# Patient Record
Sex: MALE | Race: WHITE | Employment: OTHER | URBAN - METROPOLITAN AREA
[De-identification: names, ages, dates, MRNs, and addresses within clinical notes are randomized per-mention and may not be internally consistent; named-entity substitution may affect disease eponyms.]

---

## 2017-03-07 ENCOUNTER — HOSPITAL ENCOUNTER (OUTPATIENT)
Dept: CT IMAGING | Age: 62
Discharge: HOME | End: 2017-03-07
Attending: ORTHOPAEDIC SURGERY
Payer: COMMERCIAL

## 2017-03-07 DIAGNOSIS — M46.46 DISCITIS OF LUMBAR REGION: ICD-10-CM

## 2017-03-07 DIAGNOSIS — Z98.1 S/P LUMBAR FUSION: ICD-10-CM

## 2017-03-07 PROCEDURE — 72131 CT LUMBAR SPINE W/O DYE: CPT

## 2017-07-26 ENCOUNTER — HOSPITAL ENCOUNTER (INPATIENT)
Age: 62
LOS: 2 days | Discharge: HOME | DRG: 378 | End: 2017-07-28
Attending: EMERGENCY MEDICINE | Admitting: HOSPITALIST
Payer: COMMERCIAL

## 2017-07-26 DIAGNOSIS — D50.0 CHRONIC BLOOD LOSS ANEMIA: ICD-10-CM

## 2017-07-26 DIAGNOSIS — Z79.01 ANTICOAGULANT LONG-TERM USE: ICD-10-CM

## 2017-07-26 DIAGNOSIS — I48.0 PAROXYSMAL ATRIAL FIBRILLATION (HCC): ICD-10-CM

## 2017-07-26 DIAGNOSIS — Z92.29 HX: LONG TERM ANTICOAGULANT USE: ICD-10-CM

## 2017-07-26 DIAGNOSIS — K92.2 GASTROINTESTINAL HEMORRHAGE, UNSPECIFIED GASTROINTESTINAL HEMORRHAGE TYPE: ICD-10-CM

## 2017-07-26 DIAGNOSIS — R42 LIGHTHEADEDNESS: ICD-10-CM

## 2017-07-26 DIAGNOSIS — D50.0 ANEMIA, BLOOD LOSS: Primary | ICD-10-CM

## 2017-07-26 DIAGNOSIS — K92.1 GASTROINTESTINAL HEMORRHAGE WITH MELENA: ICD-10-CM

## 2017-07-26 PROBLEM — D64.9 ANEMIA: Status: ACTIVE | Noted: 2017-07-26

## 2017-07-26 PROBLEM — D50.9 IRON DEFICIENCY ANEMIA: Status: ACTIVE | Noted: 2017-07-26

## 2017-07-26 PROBLEM — R19.5 HEME POSITIVE STOOL: Status: ACTIVE | Noted: 2017-07-26

## 2017-07-26 LAB
ALBUMIN SERPL BCP-MCNC: 3.5 G/DL (ref 3.5–5)
ALBUMIN/GLOB SERPL: 1 {RATIO} (ref 1.1–2.2)
ALP SERPL-CCNC: 98 U/L (ref 45–117)
ALT SERPL-CCNC: 13 U/L (ref 12–78)
ANION GAP BLD CALC-SCNC: 8 MMOL/L (ref 5–15)
AST SERPL W P-5'-P-CCNC: 8 U/L (ref 15–37)
BASOPHILS # BLD AUTO: 0 K/UL
BASOPHILS # BLD: 0 %
BILIRUB SERPL-MCNC: 0.5 MG/DL (ref 0.2–1)
BUN SERPL-MCNC: 14 MG/DL (ref 6–20)
BUN/CREAT SERPL: 14 (ref 12–20)
CALCIUM SERPL-MCNC: 8.8 MG/DL (ref 8.5–10.1)
CHLORIDE SERPL-SCNC: 101 MMOL/L (ref 97–108)
CO2 SERPL-SCNC: 25 MMOL/L (ref 21–32)
CREAT SERPL-MCNC: 1.02 MG/DL (ref 0.7–1.3)
EOSINOPHIL # BLD: 0.2 K/UL
EOSINOPHIL NFR BLD: 3 %
ERYTHROCYTE [DISTWIDTH] IN BLOOD BY AUTOMATED COUNT: 19.4 % (ref 11.5–14.5)
FERRITIN SERPL-MCNC: 2 NG/ML (ref 26–388)
GLOBULIN SER CALC-MCNC: 3.5 G/DL (ref 2–4)
GLUCOSE BLD STRIP.AUTO-MCNC: 205 MG/DL (ref 65–100)
GLUCOSE SERPL-MCNC: 235 MG/DL (ref 65–100)
HCT VFR BLD AUTO: 20.8 % (ref 36.6–50.3)
HEMOCCULT STL QL: POSITIVE
HGB BLD-MCNC: 5.5 G/DL (ref 12.1–17)
INR PPP: 1.2 (ref 0.9–1.1)
IRON SATN MFR SERPL: 4 % (ref 20–50)
IRON SERPL-MCNC: 17 UG/DL (ref 35–150)
LACTATE SERPL-SCNC: 2.4 MMOL/L (ref 0.4–2)
LACTATE SERPL-SCNC: 3.7 MMOL/L (ref 0.4–2)
LYMPHOCYTES # BLD AUTO: 15 %
LYMPHOCYTES # BLD: 1.1 K/UL
MAGNESIUM SERPL-MCNC: 1.4 MG/DL (ref 1.6–2.4)
MCH RBC QN AUTO: 16.3 PG (ref 26–34)
MCHC RBC AUTO-ENTMCNC: 26.4 G/DL (ref 30–36.5)
MCV RBC AUTO: 61.5 FL (ref 80–99)
METAMYELOCYTES NFR BLD MANUAL: 3 %
MONOCYTES # BLD: 0.4 K/UL
MONOCYTES NFR BLD AUTO: 6 %
NEUTS BAND NFR BLD MANUAL: 16 %
NEUTS SEG # BLD: 5.3 K/UL
NEUTS SEG NFR BLD AUTO: 57 %
NRBC # BLD: 0.04 K/UL (ref 0–0.01)
NRBC BLD-RTO: 0.5 PER 100 WBC
PLATELET # BLD AUTO: 316 K/UL (ref 150–400)
POTASSIUM SERPL-SCNC: 4.4 MMOL/L (ref 3.5–5.1)
PROT SERPL-MCNC: 7 G/DL (ref 6.4–8.2)
PROTHROMBIN TIME: 12.3 SEC (ref 9–11.1)
RBC # BLD AUTO: 3.38 M/UL (ref 4.1–5.7)
RBC MORPH BLD: ABNORMAL
SERVICE CMNT-IMP: ABNORMAL
SODIUM SERPL-SCNC: 134 MMOL/L (ref 136–145)
TIBC SERPL-MCNC: 464 UG/DL (ref 250–450)
TROPONIN I SERPL-MCNC: <0.04 NG/ML
VIT B12 SERPL-MCNC: 164 PG/ML (ref 211–911)
WBC # BLD AUTO: 7.2 K/UL (ref 4.1–11.1)
WBC MORPH BLD: ABNORMAL
WBC NRBC COR # BLD: ABNORMAL 10*3/UL

## 2017-07-26 PROCEDURE — 83605 ASSAY OF LACTIC ACID: CPT | Performed by: EMERGENCY MEDICINE

## 2017-07-26 PROCEDURE — 36430 TRANSFUSION BLD/BLD COMPNT: CPT

## 2017-07-26 PROCEDURE — 65660000000 HC RM CCU STEPDOWN

## 2017-07-26 PROCEDURE — 74011250636 HC RX REV CODE- 250/636: Performed by: EMERGENCY MEDICINE

## 2017-07-26 PROCEDURE — 30233N1 TRANSFUSION OF NONAUTOLOGOUS RED BLOOD CELLS INTO PERIPHERAL VEIN, PERCUTANEOUS APPROACH: ICD-10-PCS | Performed by: INTERNAL MEDICINE

## 2017-07-26 PROCEDURE — 36415 COLL VENOUS BLD VENIPUNCTURE: CPT | Performed by: EMERGENCY MEDICINE

## 2017-07-26 PROCEDURE — 85025 COMPLETE CBC W/AUTO DIFF WBC: CPT | Performed by: EMERGENCY MEDICINE

## 2017-07-26 PROCEDURE — P9016 RBC LEUKOCYTES REDUCED: HCPCS | Performed by: EMERGENCY MEDICINE

## 2017-07-26 PROCEDURE — 74011250636 HC RX REV CODE- 250/636: Performed by: HOSPITALIST

## 2017-07-26 PROCEDURE — 83735 ASSAY OF MAGNESIUM: CPT | Performed by: EMERGENCY MEDICINE

## 2017-07-26 PROCEDURE — 86900 BLOOD TYPING SEROLOGIC ABO: CPT | Performed by: EMERGENCY MEDICINE

## 2017-07-26 PROCEDURE — 93005 ELECTROCARDIOGRAM TRACING: CPT

## 2017-07-26 PROCEDURE — 74011250637 HC RX REV CODE- 250/637: Performed by: HOSPITALIST

## 2017-07-26 PROCEDURE — 82962 GLUCOSE BLOOD TEST: CPT

## 2017-07-26 PROCEDURE — 82607 VITAMIN B-12: CPT | Performed by: HOSPITALIST

## 2017-07-26 PROCEDURE — 85610 PROTHROMBIN TIME: CPT | Performed by: EMERGENCY MEDICINE

## 2017-07-26 PROCEDURE — 84484 ASSAY OF TROPONIN QUANT: CPT | Performed by: EMERGENCY MEDICINE

## 2017-07-26 PROCEDURE — 74011250637 HC RX REV CODE- 250/637: Performed by: INTERNAL MEDICINE

## 2017-07-26 PROCEDURE — 82272 OCCULT BLD FECES 1-3 TESTS: CPT | Performed by: EMERGENCY MEDICINE

## 2017-07-26 PROCEDURE — 86923 COMPATIBILITY TEST ELECTRIC: CPT | Performed by: EMERGENCY MEDICINE

## 2017-07-26 PROCEDURE — 82728 ASSAY OF FERRITIN: CPT | Performed by: HOSPITALIST

## 2017-07-26 PROCEDURE — 77030029131 HC ADMN ST IV BLD N DEHP ICUM -B

## 2017-07-26 PROCEDURE — 80053 COMPREHEN METABOLIC PANEL: CPT | Performed by: EMERGENCY MEDICINE

## 2017-07-26 PROCEDURE — 99285 EMERGENCY DEPT VISIT HI MDM: CPT

## 2017-07-26 PROCEDURE — 83540 ASSAY OF IRON: CPT | Performed by: HOSPITALIST

## 2017-07-26 RX ORDER — SODIUM CHLORIDE 9 MG/ML
250 INJECTION, SOLUTION INTRAVENOUS AS NEEDED
Status: DISCONTINUED | OUTPATIENT
Start: 2017-07-26 | End: 2017-07-28 | Stop reason: HOSPADM

## 2017-07-26 RX ORDER — ACETAMINOPHEN 325 MG/1
650 TABLET ORAL
Status: DISCONTINUED | OUTPATIENT
Start: 2017-07-26 | End: 2017-07-28 | Stop reason: HOSPADM

## 2017-07-26 RX ORDER — ONDANSETRON 2 MG/ML
4 INJECTION INTRAMUSCULAR; INTRAVENOUS
Status: DISCONTINUED | OUTPATIENT
Start: 2017-07-26 | End: 2017-07-28 | Stop reason: HOSPADM

## 2017-07-26 RX ORDER — SODIUM CHLORIDE 0.9 % (FLUSH) 0.9 %
5-10 SYRINGE (ML) INJECTION EVERY 8 HOURS
Status: DISCONTINUED | OUTPATIENT
Start: 2017-07-26 | End: 2017-07-28 | Stop reason: HOSPADM

## 2017-07-26 RX ORDER — MAGNESIUM SULFATE 100 %
4 CRYSTALS MISCELLANEOUS AS NEEDED
Status: DISCONTINUED | OUTPATIENT
Start: 2017-07-26 | End: 2017-07-28 | Stop reason: HOSPADM

## 2017-07-26 RX ORDER — ONDANSETRON 4 MG/1
4 TABLET, ORALLY DISINTEGRATING ORAL
Status: DISCONTINUED | OUTPATIENT
Start: 2017-07-26 | End: 2017-07-28 | Stop reason: HOSPADM

## 2017-07-26 RX ORDER — SODIUM CHLORIDE 0.9 % (FLUSH) 0.9 %
5-10 SYRINGE (ML) INJECTION AS NEEDED
Status: DISCONTINUED | OUTPATIENT
Start: 2017-07-26 | End: 2017-07-28 | Stop reason: HOSPADM

## 2017-07-26 RX ORDER — SODIUM CHLORIDE 9 MG/ML
75 INJECTION, SOLUTION INTRAVENOUS CONTINUOUS
Status: DISCONTINUED | OUTPATIENT
Start: 2017-07-26 | End: 2017-07-28

## 2017-07-26 RX ORDER — OMEPRAZOLE 20 MG/1
20 CAPSULE, DELAYED RELEASE ORAL 2 TIMES DAILY
COMMUNITY
End: 2017-07-28

## 2017-07-26 RX ORDER — MAGNESIUM SULFATE HEPTAHYDRATE 40 MG/ML
2 INJECTION, SOLUTION INTRAVENOUS ONCE
Status: COMPLETED | OUTPATIENT
Start: 2017-07-26 | End: 2017-07-26

## 2017-07-26 RX ORDER — DILTIAZEM HYDROCHLORIDE 120 MG/1
120 CAPSULE, COATED, EXTENDED RELEASE ORAL
Status: DISCONTINUED | OUTPATIENT
Start: 2017-07-27 | End: 2017-07-28 | Stop reason: HOSPADM

## 2017-07-26 RX ORDER — DEXTROSE 50 % IN WATER (D50W) INTRAVENOUS SYRINGE
12.5-25 AS NEEDED
Status: DISCONTINUED | OUTPATIENT
Start: 2017-07-26 | End: 2017-07-28 | Stop reason: HOSPADM

## 2017-07-26 RX ORDER — LISINOPRIL 20 MG/1
20 TABLET ORAL DAILY
Status: DISCONTINUED | OUTPATIENT
Start: 2017-07-27 | End: 2017-07-28 | Stop reason: HOSPADM

## 2017-07-26 RX ORDER — PANTOPRAZOLE SODIUM 40 MG/1
40 TABLET, DELAYED RELEASE ORAL
Status: DISCONTINUED | OUTPATIENT
Start: 2017-07-26 | End: 2017-07-28 | Stop reason: HOSPADM

## 2017-07-26 RX ORDER — INSULIN LISPRO 100 [IU]/ML
INJECTION, SOLUTION INTRAVENOUS; SUBCUTANEOUS
Status: DISCONTINUED | OUTPATIENT
Start: 2017-07-27 | End: 2017-07-28 | Stop reason: HOSPADM

## 2017-07-26 RX ORDER — DIPHENHYDRAMINE HCL 25 MG
25 CAPSULE ORAL
Status: DISCONTINUED | OUTPATIENT
Start: 2017-07-26 | End: 2017-07-28 | Stop reason: HOSPADM

## 2017-07-26 RX ADMIN — DIPHENHYDRAMINE HYDROCHLORIDE 25 MG: 25 CAPSULE ORAL at 22:51

## 2017-07-26 RX ADMIN — Medication 10 ML: at 21:43

## 2017-07-26 RX ADMIN — MAGNESIUM SULFATE HEPTAHYDRATE 2 G: 40 INJECTION, SOLUTION INTRAVENOUS at 18:47

## 2017-07-26 RX ADMIN — ONDANSETRON 4 MG: 4 TABLET, ORALLY DISINTEGRATING ORAL at 22:56

## 2017-07-26 RX ADMIN — PANTOPRAZOLE SODIUM 40 MG: 40 TABLET, DELAYED RELEASE ORAL at 18:47

## 2017-07-26 RX ADMIN — SODIUM CHLORIDE 1000 ML: 900 INJECTION, SOLUTION INTRAVENOUS at 16:17

## 2017-07-26 NOTE — IP AVS SNAPSHOT
[address] [facility] 
[phone] Patient: Mari Candelario MRN: [id number] HAJ:[birth year] You are allergic to the following Allergen Reactions Demerol (Meperidine) Hives Pcn (Penicillins) Hives Recent Documentation Height Weight BMI Smoking Status 1.829 m 95.5 kg 28.55 kg/m2 Never Smoker Emergency Contacts Name Discharge Info Relation Home Work Mobile [address] CAREGIVER [3] Spouse [3] [phone] About your hospitalization You were admitted on:  July 26, 2017 You last received care in the:  [facility] You were discharged on:  July 28, 2017 Unit phone number:  [phone] Why you were hospitalized Your primary diagnosis was:  Gastrointestinal Hemorrhage With Melena Your diagnoses also included:  Chronic Blood Loss Anemia, Anticoagulant Long-Term Use, Heme Positive Stool, Spinal Stenosis, Lumbar Region, With Neurogenic Claudication, Paroxysmal Atrial Fibrillation (Hcc), Uncontrolled Diabetes Mellitus Type 2 Without Complications (Hcc), Hypomagnesemia Providers Seen During Your Hospitalizations Provider Role Specialty Primary office phone Maikel Santana MD Attending Provider Emergency Medicine [phone] Juancarlos Bonilla MD Attending Provider Internal Medicine [phone] Your Primary Care Physician (PCP) Primary Care Physician Office Phone Office Fax Lakhwinderfelecia Neel [phone] Follow-up Information Follow up With Details Comments Contact Info Juancarlos Bonilla MD On 8/4/2017 9:10am  [place] [facility] [place] 
[phone] Your Appointments Friday August 04, 2017  9:10 AM EDT Any with Juancarlos Bonilla MD  
 [address] ([facility]) [address] [id number] [phone] Current Discharge Medication List  
  
START taking these medications Dose & Instructions Dispensing Information Comments Morning Noon Evening Bedtime  
 lisinopril 20 mg tablet Commonly known as:  Keri Bills Your last dose was: Your next dose is:    
   
   
 Dose:  20 mg Take 1 Tab by mouth daily. Quantity:  30 Tab Refills:  3  
     
   
   
   
  
 pantoprazole 40 mg tablet Commonly known as:  PROTONIX Your last dose was: Your next dose is:    
   
   
 Dose:  40 mg Take 1 Tab by mouth Before breakfast and dinner. Quantity:  60 Tab Refills:  0  
     
   
   
   
  
 sucralfate 100 mg/mL suspension Commonly known as:  Michael Dec Your last dose was: Your next dose is:    
   
   
 Dose:  2 tsp Take 10 mL by mouth Before breakfast, lunch, dinner and at bedtime. Quantity:  160 mL Refills:  0 CONTINUE these medications which have NOT CHANGED Dose & Instructions Dispensing Information Comments Morning Noon Evening Bedtime CARDIZEM  mg ER capsule Generic drug:  dilTIAZem CD Your last dose was: Your next dose is:    
   
   
 Dose:  120 mg Take 120 mg by mouth every morning. Indications: HYPERTENSION Refills:  0  
     
   
   
   
  
 metFORMIN 1,000 mg tablet Commonly known as:  GLUCOPHAGE Your last dose was: Your next dose is:    
   
   
 Dose:  1000 mg Take 1,000 mg by mouth two (2) times daily (with meals). Refills:  0  
     
   
   
   
  
 ondansetron 4 mg disintegrating tablet Commonly known as:  ZOFRAN ODT Your last dose was: Your next dose is:    
   
   
 Dose:  4 mg Take 1 Tab by mouth every eight (8) hours as needed for Nausea. Quantity:  20 Tab Refills:  0 STOP taking these medications ELIQUIS 5 mg tablet Generic drug:  apixaban PriLOSEC 20 mg capsule Generic drug:  omeprazole Where to Get Your Medications These medications were sent to [facility]/pharmacy #[id number]- [address], [address] [address]  [address] [place] [address], [address] [address] Hours:  24-hours Phone:  [phone]  
  lisinopril 20 mg tablet  
 pantoprazole 40 mg tablet  
 sucralfate 100 mg/mL suspension Discharge Instructions [facility] 76. [address] 
[phone] Patient Discharge Instructions Kodyshai Opal / [id number] : 1955 Admitted 2017 Discharged: 2017 Take Home Medications · It is important that you take the medication exactly as they are prescribed. · Keep your medication in the bottles provided by the pharmacist and keep a list of the medication names, dosages, and times to be taken in your wallet. · Do not take other medications without consulting your doctor. What to do at [facility] Recommended diet: Regular Diet, Recommended activity: Activity as tolerated, Follow-up with Dr Selina Faustin in 1 week and Dr Shayy Mahmood for endoscopy and colonoscopy as instructed Information obtained by : 
I understand that if any problems occur once I am at home I am to contact my physician. I understand and acknowledge receipt of the instructions indicated above. Physician's or R.N.'s Signature                                                                  Date/Time Patient or Representative Signature                                                          Date/Time Discharge Orders None [employer] Announcement We are excited to announce that we are making your provider's discharge notes available to you in [employer]. You will see these notes when they are completed and signed by the physician that discharged you from your recent hospital stay. If you have any questions or concerns about any information you see in [employer], please call the Health Information Department where you were seen or reach out to your Primary Care Provider for more information about your plan of care. Introducing [facility]! Sophie Arndt introduces [employer] patient portal. Now you can access parts of your medical record, email your doctor's office, and request medication refills online. 1. In your internet browser, go to [email or web address] 2. Click on the First Time User? Click Here link in the Sign In box. You will see the New Member Sign Up page. 3. Enter your [employer] Access Code exactly as it appears below. You will not need to use this code after youve completed the sign-up process. If you do not sign up before the expiration date, you must request a new code. · [employer] Access Code: [email or web address] Expires: 10/24/2017  2:00 PM 
 
4. Enter the last four digits of your Social Security Number (xxxx) and Date of Birth (mm/dd/yyyy) as indicated and click Submit. You will be taken to the next sign-up page. 5. Create a [employer] ID. This will be your [employer] login ID and cannot be changed, so think of one that is secure and easy to remember. 6. Create a [employer] password. You can change your password at any time. 7. Enter your Password Reset Question and Answer. This can be used at a later time if you forget your password. 8. Enter your e-mail address. You will receive e-mail notification when new information is available in [address] 9. Click Sign Up. You can now view and download portions of your medical record. 10. Click the Download Summary menu link to download a portable copy of your medical information. If you have questions, please visit the Frequently Asked Questions section of the [employer] website. Remember, [employer] is NOT to be used for urgent needs. For medical emergencies, dial 911. Now available from your iPhone and Android! General Information Please provide this summary of care documentation to your next provider. Patient Signature:  ____________________________________________________________ Date:  ____________________________________________________________  
  
[facility] Provider Signature:  ____________________________________________________________ Date:  ____________________________________________________________

## 2017-07-26 NOTE — ED NOTES
Assumed care of patient. Pt resting comfortably with wife at bedside. Pt on monitor X3 and call bell within reach. Updated on plan of care. Will continue to monitor.
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
## 2017-07-26 NOTE — PROGRESS NOTES
Pt vaishaliut arrived to unit. Will defer physical assessment to oncoming shift, obtaining blood consent for transfusion at this time. Vitals being taken and pt gaetano placed on telemetry . Pt voicing no complaints at this time. Pt alert and oriented , oriented to room .

## 2017-07-26 NOTE — ED NOTES
Assumed care of pt from Triage via wheelchair. Pt complaining of general weakness. States he gets dizzy walking across a room. Per pt's wife pt has felt weak for months and now pt is very pale. Pt on monitor x3 . VSS. pts wife at bedside.

## 2017-07-26 NOTE — ED NOTES
TRANSFER - OUT REPORT:    Verbal report given to Brit Baker RN(name) on Leonard Colin  being transferred to [facility] Room 3123(unit) for routine progression of care       Report consisted of patients Situation, Background, Assessment and   Recommendations(SBAR). Information from the following report(s) SBAR, Kardex and ED Summary was reviewed with the receiving nurse. Lines:   Peripheral IV 07/26/17 Left Antecubital (Active)   Site Assessment Clean, dry, & intact 7/26/2017  2:53 PM   Phlebitis Assessment 0 7/26/2017  2:53 PM   Infiltration Assessment 0 7/26/2017  2:53 PM   Dressing Status Clean, dry, & intact 7/26/2017  2:53 PM   Hub Color/Line Status Pink 7/26/2017  2:53 PM        Opportunity for questions and clarification was provided.       Patient transported with:   Shidonni
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
## 2017-07-26 NOTE — ED NOTES
Bedside and Verbal shift change report given to Collette (oncoming nurse) by Rosalind Raya (offgoing nurse). Report included the following information SBAR and ED Summary.

## 2017-07-26 NOTE — ED NOTES
Pt reports he self checked his INR and it was 1.1. Dr. Joseline Garg at bedside and educated pt and wife on normal levels.

## 2017-07-27 PROBLEM — D50.0 CHRONIC BLOOD LOSS ANEMIA: Status: ACTIVE | Noted: 2017-07-26

## 2017-07-27 PROBLEM — E83.42 HYPOMAGNESEMIA: Status: ACTIVE | Noted: 2017-07-27

## 2017-07-27 PROBLEM — I48.0 PAROXYSMAL ATRIAL FIBRILLATION (HCC): Status: ACTIVE | Noted: 2017-07-27

## 2017-07-27 PROBLEM — K92.1 GASTROINTESTINAL HEMORRHAGE WITH MELENA: Status: ACTIVE | Noted: 2017-07-27

## 2017-07-27 PROBLEM — D62 ACUTE BLOOD LOSS ANEMIA: Status: ACTIVE | Noted: 2017-07-26

## 2017-07-27 LAB
ANION GAP BLD CALC-SCNC: 7 MMOL/L (ref 5–15)
APPEARANCE UR: CLEAR
ATRIAL RATE: 78 BPM
BASOPHILS # BLD AUTO: 0 K/UL
BASOPHILS # BLD: 0 %
BILIRUB UR QL: NEGATIVE
BUN SERPL-MCNC: 15 MG/DL (ref 6–20)
BUN/CREAT SERPL: 17 (ref 12–20)
CALCIUM SERPL-MCNC: 8.3 MG/DL (ref 8.5–10.1)
CALCULATED P AXIS, ECG09: 27 DEGREES
CALCULATED R AXIS, ECG10: -60 DEGREES
CALCULATED T AXIS, ECG11: 104 DEGREES
CHLORIDE SERPL-SCNC: 104 MMOL/L (ref 97–108)
CO2 SERPL-SCNC: 24 MMOL/L (ref 21–32)
COLOR UR: ABNORMAL
CREAT SERPL-MCNC: 0.87 MG/DL (ref 0.7–1.3)
DIAGNOSIS, 93000: NORMAL
DIFFERENTIAL METHOD BLD: ABNORMAL
EOSINOPHIL # BLD: 0.2 K/UL
EOSINOPHIL NFR BLD: 3 %
ERYTHROCYTE [DISTWIDTH] IN BLOOD BY AUTOMATED COUNT: 22.3 % (ref 11.5–14.5)
GLUCOSE BLD STRIP.AUTO-MCNC: 153 MG/DL (ref 65–100)
GLUCOSE BLD STRIP.AUTO-MCNC: 208 MG/DL (ref 65–100)
GLUCOSE BLD STRIP.AUTO-MCNC: 229 MG/DL (ref 65–100)
GLUCOSE BLD STRIP.AUTO-MCNC: 245 MG/DL (ref 65–100)
GLUCOSE SERPL-MCNC: 216 MG/DL (ref 65–100)
GLUCOSE UR STRIP.AUTO-MCNC: 250 MG/DL
HCT VFR BLD AUTO: 23.4 % (ref 36.6–50.3)
HGB BLD-MCNC: 6.6 G/DL (ref 12.1–17)
HGB UR QL STRIP: NEGATIVE
KETONES UR QL STRIP.AUTO: ABNORMAL MG/DL
LACTATE SERPL-SCNC: 1 MMOL/L (ref 0.4–2)
LEUKOCYTE ESTERASE UR QL STRIP.AUTO: NEGATIVE
LIPASE SERPL-CCNC: 102 U/L (ref 73–393)
LYMPHOCYTES # BLD AUTO: 18 %
LYMPHOCYTES # BLD: 1.3 K/UL
MAGNESIUM SERPL-MCNC: 2 MG/DL (ref 1.6–2.4)
MCH RBC QN AUTO: 18.4 PG (ref 26–34)
MCHC RBC AUTO-ENTMCNC: 28.2 G/DL (ref 30–36.5)
MCV RBC AUTO: 65.4 FL (ref 80–99)
MONOCYTES # BLD: 0.1 K/UL
MONOCYTES NFR BLD AUTO: 2 %
NEUTS SEG # BLD: 5.8 K/UL
NEUTS SEG NFR BLD AUTO: 77 %
NITRITE UR QL STRIP.AUTO: NEGATIVE
NRBC # BLD: 0.05 K/UL (ref 0–0.01)
NRBC BLD-RTO: 0.6 PER 100 WBC
P-R INTERVAL, ECG05: 168 MS
PH UR STRIP: 7 [PH] (ref 5–8)
PHOSPHATE SERPL-MCNC: 3.3 MG/DL (ref 2.6–4.7)
PLATELET # BLD AUTO: 287 K/UL (ref 150–400)
PLATELET COMMENTS,PCOM: ABNORMAL
POTASSIUM SERPL-SCNC: 4 MMOL/L (ref 3.5–5.1)
PROT UR STRIP-MCNC: NEGATIVE MG/DL
Q-T INTERVAL, ECG07: 398 MS
QRS DURATION, ECG06: 126 MS
QTC CALCULATION (BEZET), ECG08: 453 MS
RBC # BLD AUTO: 3.58 M/UL (ref 4.1–5.7)
RBC MORPH BLD: ABNORMAL
SERVICE CMNT-IMP: ABNORMAL
SODIUM SERPL-SCNC: 135 MMOL/L (ref 136–145)
SP GR UR REFRACTOMETRY: 1.02 (ref 1–1.03)
UROBILINOGEN UR QL STRIP.AUTO: 2 EU/DL (ref 0.2–1)
VENTRICULAR RATE, ECG03: 78 BPM
WBC # BLD AUTO: 7.4 K/UL (ref 4.1–11.1)
WBC MORPH BLD: ABNORMAL
WBC NRBC COR # BLD: ABNORMAL 10*3/UL

## 2017-07-27 PROCEDURE — 82962 GLUCOSE BLOOD TEST: CPT

## 2017-07-27 PROCEDURE — 80048 BASIC METABOLIC PNL TOTAL CA: CPT | Performed by: HOSPITALIST

## 2017-07-27 PROCEDURE — 85025 COMPLETE CBC W/AUTO DIFF WBC: CPT | Performed by: HOSPITALIST

## 2017-07-27 PROCEDURE — P9016 RBC LEUKOCYTES REDUCED: HCPCS | Performed by: EMERGENCY MEDICINE

## 2017-07-27 PROCEDURE — 36415 COLL VENOUS BLD VENIPUNCTURE: CPT | Performed by: HOSPITALIST

## 2017-07-27 PROCEDURE — 65660000000 HC RM CCU STEPDOWN

## 2017-07-27 PROCEDURE — 74011250636 HC RX REV CODE- 250/636: Performed by: INTERNAL MEDICINE

## 2017-07-27 PROCEDURE — 81003 URINALYSIS AUTO W/O SCOPE: CPT | Performed by: EMERGENCY MEDICINE

## 2017-07-27 PROCEDURE — 83690 ASSAY OF LIPASE: CPT | Performed by: HOSPITALIST

## 2017-07-27 PROCEDURE — 77030029131 HC ADMN ST IV BLD N DEHP ICUM -B

## 2017-07-27 PROCEDURE — 84100 ASSAY OF PHOSPHORUS: CPT | Performed by: HOSPITALIST

## 2017-07-27 PROCEDURE — 83605 ASSAY OF LACTIC ACID: CPT | Performed by: INTERNAL MEDICINE

## 2017-07-27 PROCEDURE — 74011636637 HC RX REV CODE- 636/637: Performed by: HOSPITALIST

## 2017-07-27 PROCEDURE — 74011250636 HC RX REV CODE- 250/636: Performed by: HOSPITALIST

## 2017-07-27 PROCEDURE — 74011250637 HC RX REV CODE- 250/637: Performed by: HOSPITALIST

## 2017-07-27 PROCEDURE — 83735 ASSAY OF MAGNESIUM: CPT | Performed by: HOSPITALIST

## 2017-07-27 PROCEDURE — 36430 TRANSFUSION BLD/BLD COMPNT: CPT

## 2017-07-27 RX ORDER — SODIUM CHLORIDE 9 MG/ML
250 INJECTION, SOLUTION INTRAVENOUS AS NEEDED
Status: DISCONTINUED | OUTPATIENT
Start: 2017-07-27 | End: 2017-07-28 | Stop reason: HOSPADM

## 2017-07-27 RX ADMIN — DILTIAZEM HYDROCHLORIDE 120 MG: 120 CAPSULE, EXTENDED RELEASE ORAL at 06:26

## 2017-07-27 RX ADMIN — Medication 10 ML: at 03:36

## 2017-07-27 RX ADMIN — Medication 10 ML: at 06:28

## 2017-07-27 RX ADMIN — INSULIN LISPRO 4 UNITS: 100 INJECTION, SOLUTION INTRAVENOUS; SUBCUTANEOUS at 08:24

## 2017-07-27 RX ADMIN — PANTOPRAZOLE SODIUM 40 MG: 40 TABLET, DELAYED RELEASE ORAL at 17:40

## 2017-07-27 RX ADMIN — SODIUM CHLORIDE 75 ML/HR: 900 INJECTION, SOLUTION INTRAVENOUS at 22:48

## 2017-07-27 RX ADMIN — LISINOPRIL 20 MG: 20 TABLET ORAL at 08:23

## 2017-07-27 RX ADMIN — Medication 10 ML: at 21:27

## 2017-07-27 RX ADMIN — PANTOPRAZOLE SODIUM 40 MG: 40 TABLET, DELAYED RELEASE ORAL at 06:27

## 2017-07-27 RX ADMIN — INSULIN LISPRO 4 UNITS: 100 INJECTION, SOLUTION INTRAVENOUS; SUBCUTANEOUS at 12:31

## 2017-07-27 RX ADMIN — ACETAMINOPHEN 650 MG: 325 TABLET, FILM COATED ORAL at 21:27

## 2017-07-27 RX ADMIN — INSULIN LISPRO 4 UNITS: 100 INJECTION, SOLUTION INTRAVENOUS; SUBCUTANEOUS at 17:40

## 2017-07-27 RX ADMIN — SODIUM CHLORIDE 125 ML/HR: 900 INJECTION, SOLUTION INTRAVENOUS at 03:32

## 2017-07-27 NOTE — PROGRESS NOTES
Report given to Garcia Yates by Beka Inch Phone:   [phone]        Significant changes during shift:  Received 2 units PRBCs today, second unit in progress at this time, for a total of 4 units over past two days         Patient Information     Mari Candelario  57 y.o.  7/26/2017  1:54 PM by See Almendarez MD. Mari Candelario was admitted from Home     Problem List          Patient Active Problem List     Diagnosis Date Noted    Gastrointestinal hemorrhage with melena 07/27/2017    Paroxysmal atrial fibrillation ([address] 07/27/2017    Uncontrolled diabetes mellitus type 2 without complications ([address] 79/40/4459    Hypomagnesemia 07/27/2017    Iron deficiency anemia 07/26/2017    Chronic blood loss anemia 07/26/2017    Anticoagulant long-term use 07/26/2017    Heme positive stool 07/26/2017    Post-operative infection 10/25/2015    Back pain 10/25/2015    Discitis of lumbar region 10/25/2015    Cholelithiasis 10/05/2015    Symptomatic cholelithiasis 09/15/2015    Osteomyelitis ([address] 08/31/2015    Osteomyelitis of lumbar spine ([address] 08/20/2015    Adrenal mass, left ([address] 08/14/2015    Constipation 08/14/2015    Midline low back pain without sciatica 08/14/2015    Spinal stenosis, lumbar region, with neurogenic claudication 10/21/2014    Sciatica 10/21/2014           Past Medical History:   Diagnosis Date    Anemia      Arthritis       fingers    Atrial fibrillation (HCC)      Atrial flutter (HCC)      Bowel trouble      Chronic pain      Diabetes (HCC)      GERD (gastroesophageal reflux disease)      Hypercholesteremia      Hypertension      SVT (supraventricular tachycardia) ([facility])       Dr Ahmadi Diver    Unspecified sleep apnea              Core Measures:     CVA: No No  CHF:No No  PNA:No No        Activity Status:     OOB to Chair No  Ambulated this shift Yes   Bed Rest No     Supplemental O2: (If Applicable)     NC No  NRB No  Venti-mask No           LINES AND DRAINS:     No lines or drains except peripheral IV     DVT prophylaxis:     DVT prophylaxis Med- No  DVT prophylaxis SCD or CLARICE- No      Wounds: (If Applicable)     Wounds- No        Patient Safety:     Falls Score Total Score: 1  Safety Level_______  Bed Alarm On? No  Sitter? No     Plan for upcoming shift: monitor H/H           Discharge Plan: none at this time, possible scope procedure Tuesday, not sure if pt to go home over weekend and come in as outpt, depends on H/H.  Pt was  previously on eloquis, cardiology consulted to see about anti coagulation in the setting of possible GI bleed      Active Consults:  IP CONSULT TO GASTROENTEROLOGY  IP CONSULT TO GASTROENTEROLOGY  IP CONSULT TO CARDIOLOGY
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
## 2017-07-27 NOTE — PROGRESS NOTES
Gastroenterology Progress Note    7/27/2017    Admit Date: 7/26/2017    Subjective: Follow up for: melena,anemia      Feels ok. No new issues. Pleasant and coherent,  Patient was seen in rounds by me today. At this time, the patient is resting comfortably. Hgb is 6.6. Current Facility-Administered Medications   Medication Dose Route Frequency    0.9% sodium chloride infusion 250 mL  250 mL IntraVENous PRN    0.9% sodium chloride infusion 250 mL  250 mL IntraVENous PRN    sodium chloride (NS) flush 5-10 mL  5-10 mL IntraVENous Q8H    sodium chloride (NS) flush 5-10 mL  5-10 mL IntraVENous PRN    acetaminophen (TYLENOL) tablet 650 mg  650 mg Oral Q4H PRN    ondansetron (ZOFRAN) injection 4 mg  4 mg IntraVENous Q6H PRN    dilTIAZem CD (CARDIZEM CD) capsule 120 mg  120 mg Oral 7am    pantoprazole (PROTONIX) tablet 40 mg  40 mg Oral ACB&D    ondansetron (ZOFRAN ODT) tablet 4 mg  4 mg Oral Q6H PRN    diphenhydrAMINE (BENADRYL) capsule 25 mg  25 mg Oral Q6H PRN    insulin lispro (HUMALOG) injection   SubCUTAneous AC&HS    glucose chewable tablet 16 g  4 Tab Oral PRN    dextrose (D50W) injection syrg 12.5-25 g  12.5-25 g IntraVENous PRN    glucagon (GLUCAGEN) injection 1 mg  1 mg IntraMUSCular PRN    lisinopril (PRINIVIL, ZESTRIL) tablet 20 mg  20 mg Oral DAILY    0.9% sodium chloride infusion  75 mL/hr IntraVENous CONTINUOUS        Objective:     Blood pressure 138/80, pulse 67, temperature 98.2 °F (36.8 °C), resp. rate 16, height 6' (1.829 m), weight 95.5 kg (210 lb 8.6 oz), SpO2 95 %. Physical Examination:       General:AAO x 3,   HEENT:  EOMI, MMM  Chest:  CTA, No rhonchi, rales or rubs.   Heart: S1, S2, RRR  GI: Soft, NT, ND + bowel sounds  Extremities: No edema or cyanosis  CNS: CNs grossly normal.    Data Review    Recent Results (from the past 24 hour(s))   EKG, 12 LEAD, INITIAL    Collection Time: 07/26/17  2:05 PM   Result Value Ref Range    Ventricular Rate 78 BPM    Atrial Rate 78 BPM    P-R Interval 168 ms    QRS Duration 126 ms    Q-T Interval 398 ms    QTC Calculation (Bezet) 453 ms    Calculated P Axis 27 degrees    Calculated R Axis -60 degrees    Calculated T Axis 104 degrees    Diagnosis       Sinus rhythm with premature atrial complexes  Left ventricular hypertrophy with QRS widening and repolarization abnormality  Abnormal ECG  When compared with ECG of 25-OCT-2015 13:17,  premature atrial complexes are now present     CBC WITH AUTOMATED DIFF    Collection Time: 07/26/17  2:51 PM   Result Value Ref Range    WBC 7.2 4.1 - 11.1 K/uL    RBC 3.38 (L) 4.10 - 5.70 M/uL    HGB 5.5 (LL) 12.1 - 17.0 g/dL    HCT 20.8 (L) 36.6 - 50.3 %    MCV 61.5 (L) 80.0 - 99.0 FL    MCH 16.3 (L) 26.0 - 34.0 PG    MCHC 26.4 (L) 30.0 - 36.5 g/dL    RDW 19.4 (H) 11.5 - 14.5 %    PLATELET 700 555 - 368 K/uL    NEUTROPHILS 57 %    BAND NEUTROPHILS 16 %    LYMPHOCYTES 15 %    MONOCYTES 6 %    EOSINOPHILS 3 %    BASOPHILS 0 %    METAMYELOCYTES 3 %    ABS. NEUTROPHILS 5.3 K/UL    ABS. LYMPHOCYTES 1.1 K/UL    ABS. MONOCYTES 0.4 K/UL    ABS. EOSINOPHILS 0.2 K/UL    ABS. BASOPHILS 0.0 K/UL    RBC COMMENTS ANISOCYTOSIS  1+        RBC COMMENTS MICROCYTOSIS  1+        RBC COMMENTS HYPOCHROMIA  1+        WBC COMMENTS TOXIC GRANULATION     METABOLIC PANEL, COMPREHENSIVE    Collection Time: 07/26/17  2:51 PM   Result Value Ref Range    Sodium 134 (L) 136 - 145 mmol/L    Potassium 4.4 3.5 - 5.1 mmol/L    Chloride 101 97 - 108 mmol/L    CO2 25 21 - 32 mmol/L    Anion gap 8 5 - 15 mmol/L    Glucose 235 (H) 65 - 100 mg/dL    BUN 14 6 - 20 MG/DL    Creatinine 1.02 0.70 - 1.30 MG/DL    BUN/Creatinine ratio 14 12 - 20      GFR est AA >60 >60 ml/min/1.73m2    GFR est non-AA >60 >60 ml/min/1.73m2    Calcium 8.8 8.5 - 10.1 MG/DL    Bilirubin, total 0.5 0.2 - 1.0 MG/DL    ALT (SGPT) 13 12 - 78 U/L    AST (SGOT) 8 (L) 15 - 37 U/L    Alk.  phosphatase 98 45 - 117 U/L    Protein, total 7.0 6.4 - 8.2 g/dL Albumin 3.5 3.5 - 5.0 g/dL    Globulin 3.5 2.0 - 4.0 g/dL    A-G Ratio 1.0 (L) 1.1 - 2.2     MAGNESIUM    Collection Time: 07/26/17  2:51 PM   Result Value Ref Range    Magnesium 1.4 (L) 1.6 - 2.4 mg/dL   TROPONIN I    Collection Time: 07/26/17  2:51 PM   Result Value Ref Range    Troponin-I, Qt. <0.04 <0.05 ng/mL   TYPE & SCREEN    Collection Time: 07/26/17  2:51 PM   Result Value Ref Range    Crossmatch Expiration 07/29/2017     ABO/Rh(D) B POSITIVE     Antibody screen NEG     Unit number [id number]     Blood component type RC LR AS1     Unit division 00     Status of unit ISSUED     Crossmatch result Compatible     Unit number [id number]     Blood component type RC LR AS1     Unit division 00     Status of unit ISSUED     Crossmatch result Compatible     Unit number [id number]     Blood component type RC LR AS1     Unit division 00     Status of unit ALLOCATED     Crossmatch result Compatible     Unit number [id number]     Blood component type RC LR AS1     Unit division 00     Status of unit ALLOCATED     Crossmatch result Compatible    LACTIC ACID    Collection Time: 07/26/17  2:51 PM   Result Value Ref Range    Lactic acid 3.7 (HH) 0.4 - 2.0 MMOL/L   PROTHROMBIN TIME + INR    Collection Time: 07/26/17  2:51 PM   Result Value Ref Range    INR 1.2 (H) 0.9 - 1.1      Prothrombin time 12.3 (H) 9.0 - 11.1 sec   NUCLEATED RBC    Collection Time: 07/26/17  2:51 PM   Result Value Ref Range    NRBC 0.5 (H) 0  WBC    ABSOLUTE NRBC 0.04 (H) 0.00 - 0.01 K/uL    WBC CORRECTED FOR NR ADJUSTED FOR NUCLEATED RBC'S     OCCULT BLOOD, STOOL    Collection Time: 07/26/17  3:40 PM   Result Value Ref Range    Occult blood, stool POSITIVE (A) NEG     LACTIC ACID    Collection Time: 07/26/17  5:46 PM   Result Value Ref Range    Lactic acid 2.4 (HH) 0.4 - 2.0 MMOL/L   VITAMIN B12    Collection Time: 07/26/17  5:46 PM   Result Value Ref Range    Vitamin B12 164 (L) 211 - 911 pg/mL   IRON PROFILE    Collection Time: 07/26/17  5:46 PM   Result Value Ref Range    Iron 17 (L) 35 - 150 ug/dL    TIBC 464 (H) 250 - 450 ug/dL    Iron % saturation 4 (L) 20 - 50 %   FERRITIN    Collection Time: 07/26/17  5:46 PM   Result Value Ref Range    Ferritin 2 (L) 26 - 388 NG/ML   GLUCOSE, POC    Collection Time: 07/26/17 10:59 PM   Result Value Ref Range    Glucose (POC) 205 (H) 65 - 100 mg/dL    Performed by Anahi Tanner (PCT)    LACTIC ACID    Collection Time: 07/27/17 12:05 AM   Result Value Ref Range    Lactic acid 1.0 0.4 - 2.0 MMOL/L   LIPASE    Collection Time: 07/27/17 12:05 AM   Result Value Ref Range    Lipase 102 73 - 393 U/L   CBC WITH AUTOMATED DIFF    Collection Time: 07/27/17  5:03 AM   Result Value Ref Range    WBC 7.4 4.1 - 11.1 K/uL    RBC 3.58 (L) 4.10 - 5.70 M/uL    HGB 6.6 (L) 12.1 - 17.0 g/dL    HCT 23.4 (L) 36.6 - 50.3 %    MCV 65.4 (L) 80.0 - 99.0 FL    MCH 18.4 (L) 26.0 - 34.0 PG    MCHC 28.2 (L) 30.0 - 36.5 g/dL    RDW 22.3 (H) 11.5 - 14.5 %    PLATELET 960 247 - 584 K/uL    NEUTROPHILS 77 %    LYMPHOCYTES 18 %    MONOCYTES 2 %    EOSINOPHILS 3 %    BASOPHILS 0 %    ABS. NEUTROPHILS 5.8 K/UL    ABS. LYMPHOCYTES 1.3 K/UL    ABS. MONOCYTES 0.1 K/UL    ABS. EOSINOPHILS 0.2 K/UL    ABS.  BASOPHILS 0.0 K/UL    PLATELET COMMENTS LARGE PLATELETS      RBC COMMENTS OVALOCYTES  2+        RBC COMMENTS MICROCYTOSIS  1+        RBC COMMENTS HYPOCHROMIA  3+        RBC COMMENTS ANISOCYTOSIS  2+        WBC COMMENTS RARE SCHISTOCYTES     DF MANUAL     MAGNESIUM    Collection Time: 07/27/17  5:03 AM   Result Value Ref Range    Magnesium 2.0 1.6 - 2.4 mg/dL   METABOLIC PANEL, BASIC    Collection Time: 07/27/17  5:03 AM   Result Value Ref Range    Sodium 135 (L) 136 - 145 mmol/L    Potassium 4.0 3.5 - 5.1 mmol/L    Chloride 104 97 - 108 mmol/L    CO2 24 21 - 32 mmol/L    Anion gap 7 5 - 15 mmol/L    Glucose 216 (H) 65 - 100 mg/dL    BUN 15 6 - 20 MG/DL    Creatinine 0.87 0.70 - 1.30 MG/DL    BUN/Creatinine ratio 17 12 - 20      GFR est AA >60 >60 ml/min/1.73m2    GFR est non-AA >60 >60 ml/min/1.73m2    Calcium 8.3 (L) 8.5 - 10.1 MG/DL   PHOSPHORUS    Collection Time: 07/27/17  5:03 AM   Result Value Ref Range    Phosphorus 3.3 2.6 - 4.7 MG/DL   NUCLEATED RBC    Collection Time: 07/27/17  5:03 AM   Result Value Ref Range    NRBC 0.6 (H) 0  WBC    ABSOLUTE NRBC 0.05 (H) 0.00 - 0.01 K/uL    WBC CORRECTED FOR NR ADJUSTED FOR NUCLEATED RBC'S     URINALYSIS W/ RFLX MICROSCOPIC    Collection Time: 07/27/17  6:11 AM   Result Value Ref Range    Color YELLOW/STRAW      Appearance CLEAR CLEAR      Specific gravity 1.022 1.003 - 1.030      pH (UA) 7.0 5.0 - 8.0      Protein NEGATIVE  NEG mg/dL    Glucose 250 (A) NEG mg/dL    Ketone TRACE (A) NEG mg/dL    Bilirubin NEGATIVE  NEG      Blood NEGATIVE  NEG      Urobilinogen 2.0 (H) 0.2 - 1.0 EU/dL    Nitrites NEGATIVE  NEG      Leukocyte Esterase NEGATIVE  NEG     GLUCOSE, POC    Collection Time: 07/27/17  6:30 AM   Result Value Ref Range    Glucose (POC) 229 (H) 65 - 100 mg/dL    Performed by Anahi Tanner (PCT)      Recent Labs      07/27/17   0503  07/26/17   1451   WBC  7.4  7.2   HGB  6.6*  5.5*   HCT  23.4*  20.8*   PLT  287  316     Recent Labs      07/27/17   0503  07/26/17   1451   NA  135*  134*   K  4.0  4.4   CL  104  101   CO2  24  25   BUN  15  14   CREA  0.87  1.02   GLU  216*  235*   CA  8.3*  8.8   MG  2.0  1.4*   PHOS  3.3   --      Recent Labs      07/27/17   0005  07/26/17   1451   SGOT   --   8*   AP   --   98   TP   --   7.0   ALB   --   3.5   GLOB   --   3.5   LPSE  102   --      Recent Labs      07/26/17   1451   INR  1.2*   PTP  12.3*      Recent Labs      07/26/17   1746   TIBC  464*   PSAT  4*   FERR  2*      Lab Results   Component Value Date/Time    Folate 6.4 08/24/2015 03:05 AM      No results for input(s): PH, PCO2, PO2 in the last 72 hours.   Recent Labs      07/26/17   1451   TROIQ  <0.04     Lab Results   Component Value Date/Time    Cholesterol, total 106 10/26/2010 05:40 AM HDL Cholesterol 27 10/26/2010 05:40 AM    LDL, calculated 28.8 10/26/2010 05:40 AM    Triglyceride 251 10/26/2010 05:40 AM    CHOL/HDL Ratio 3.9 10/26/2010 05:40 AM     No components found for: [place]  Lab Results   Component Value Date/Time    Color YELLOW/STRAW 07/27/2017 06:11 AM    Appearance CLEAR 07/27/2017 06:11 AM    Specific gravity 1.022 07/27/2017 06:11 AM    Specific gravity >1.030 12/29/2009 11:25 PM    pH (UA) 7.0 07/27/2017 06:11 AM    Protein NEGATIVE  07/27/2017 06:11 AM    Glucose 250 07/27/2017 06:11 AM    Ketone TRACE 07/27/2017 06:11 AM    Bilirubin NEGATIVE  07/27/2017 06:11 AM    Urobilinogen 2.0 07/27/2017 06:11 AM    Nitrites NEGATIVE  07/27/2017 06:11 AM    Leukocyte Esterase NEGATIVE  07/27/2017 06:11 AM    Epithelial cells FEW 10/25/2015 02:35 PM    Bacteria NEGATIVE  10/25/2015 02:35 PM    WBC 0-4 10/25/2015 02:35 PM    RBC 0-5 10/25/2015 02:35 PM        ROS: -CP, SOB, Dysuria, palpitations, cough. Assessment:    Principal Problem:    Gastrointestinal hemorrhage with melena (7/27/2017)    Active Problems:    Spinal stenosis, lumbar region, with neurogenic claudication (10/21/2014)      Chronic blood loss anemia (7/26/2017)      Anticoagulant long-term use (7/26/2017)      Heme positive stool (7/26/2017)      Paroxysmal atrial fibrillation ([facility] [address] (7/27/2017)      Uncontrolled diabetes mellitus type 2 without complications ([facility] [address] (2/07/5727)      Hypomagnesemia (7/27/2017)             Plan/Discussion:     1. He will not be ready for an EGD and colonoscopy by tomorrow because of Eliquis effect and a hx of large polyps. 2. I agree with  EGD/colonoscopy but this will likely get done after the weekend, off of Eliquis. 3. Needs more blood for optimization. 2 more PRBCs have been ordered. 4. Patient is not excited about staying in the hospital for procedures to be done after the weekend. 5. We can arrange for EGD/colonoscopy OP or IP, pending cardiology input.         Signed By: Mariama Lei Iva Adames MD    7/27/2017  7:19 AM

## 2017-07-27 NOTE — PROGRESS NOTES
Problem: Nausea/Vomiting (Adult)  Goal: *Absence of nausea/vomiting  Outcome: Progressing Towards Goal  Pt complained of nausea due to not being able to get dinner when in ER. Asked for saltines, which were given to him. Called Dr Sharyn Lundborg, ordered Zofran dissolvable PO tabs due to pt having only 1 IV access and receiving PRBCs. Also ordered Benedryl 25mg PO due to mild itching. Pt's symptoms relieved within 30 minutes.

## 2017-07-27 NOTE — ROUTINE PROCESS
* No surgery found *  Bedside shift change report given to Dario Smith [phone] (oncoming nurse) by Albin Soto (offgoing nurse). Report included the following information [facility]. [facility] Phone:   [phone]      Significant changes during shift:  Received 2 units PRBCs. Will receive 2 more today.   Hem 6.6        Patient Information    Yaquelin Dorantes  57 y.o.  7/26/2017  1:54 PM by Elda Jain MD. Yaquelin Dorantes was admitted from Home    Problem List    Patient Active Problem List    Diagnosis Date Noted    Gastrointestinal hemorrhage with melena 07/27/2017    Paroxysmal atrial fibrillation ([address] 07/27/2017    Uncontrolled diabetes mellitus type 2 without complications ([address] 41/78/1044    Hypomagnesemia 07/27/2017    Iron deficiency anemia 07/26/2017    Chronic blood loss anemia 07/26/2017    Anticoagulant long-term use 07/26/2017    Heme positive stool 07/26/2017    Post-operative infection 10/25/2015    Back pain 10/25/2015    Discitis of lumbar region 10/25/2015    Cholelithiasis 10/05/2015    Symptomatic cholelithiasis 09/15/2015    Osteomyelitis ([address] 08/31/2015    Osteomyelitis of lumbar spine ([address] 08/20/2015    Adrenal mass, left ([address] 08/14/2015    Constipation 08/14/2015    Midline low back pain without sciatica 08/14/2015    Spinal stenosis, lumbar region, with neurogenic claudication 10/21/2014    Sciatica 10/21/2014     Past Medical History:   Diagnosis Date    Anemia     Arthritis     fingers    Atrial fibrillation (HCC)     Atrial flutter (HCC)     Bowel trouble     Chronic pain     Diabetes (HCC)     GERD (gastroesophageal reflux disease)     Hypercholesteremia     Hypertension     SVT [facility]supraventricular tachycardia) ([facility]     Dr Tina Alcantara    Unspecified sleep apnea          Core Measures:    CVA: No No  CHF:No No  PNA:No No      Activity Status:    OOB to Chair No  Ambulated this shift Yes   Bed Rest No    Supplemental O2: (If Applicable)    NC No  NRB No  Venti-mask No        LINES AND DRAINS:    No lines or drains except peripheral IV    DVT prophylaxis:    DVT prophylaxis Med- No  DVT prophylaxis SCD or CLARICE- No     Wounds: (If Applicable)    Wounds- No      Patient Safety:    Falls Score Total Score: 1  Safety Level_______  Bed Alarm On? No  Sitter?  No    Plan for upcoming shift: 2 units PRBCs, GI consult        Discharge Plan: No     Active Consults:  IP CONSULT TO GASTROENTEROLOGY  IP CONSULT TO GASTROENTEROLOGY  IP CONSULT TO CARDIOLOGY

## 2017-07-27 NOTE — PROGRESS NOTES
[address] [place] [address]  [phone]      Medical Progress Note      NAME: Leonard Colin   :  1955  MRM:  [id number]    Date/Time: 2017  6:02 AM         Subjective:     Admitted with abdominal pain, GI bleed with severe anemia. Chronically on PPI and not taking ASA or NSAIDS. History of previous gastritis and gastric polyps. Has received 2 units of PRBC's and hgb only 6.6 this AM. BP stable.  and renal function stable. Lactic acid normal this AM.    Past Medical History:   Diagnosis Date    Anemia     Arthritis     fingers    Atrial fibrillation (HCC)     Atrial flutter (HCC)     Bowel trouble     Chronic pain     Diabetes (HCC)     GERD (gastroesophageal reflux disease)     Hypercholesteremia     Hypertension     SVT (supraventricular tachycardia) (HCC)     Dr Mary Murray Unspecified sleep apnea        ROS:  General: negative for fever, chills, sweats, weakness  Ear Nose and Throat: negative for rhinorrhea, pharyngitis, otalgia, tinnitus, speech or swallowing difficulties  Respiratory:  negative for cough, sputum production, SOB, wheezing, RIOS, pleuritic pain  Cardiology:  negative for chest pain, palpitations, orthopnea, PND, edema, syncope   Gastrointestinal: negative for abdominal pain, N/V, dysphagia, change in bowel habits, bleeding  Genitourinary: negative for frequency, urgency, dysuria, hematuria, incontinence  Muskuloskeletal : positive for chronic back pain  Dermatological: negative for rash, ulceration, mole change, new lesion  Neurological: negative for headache, dizziness, confusion, focal weakness, paresthesia, memory loss, gait disturbance  Psychological: negative for anxiety, depression, agitation  Review of systems otherwise negative         Objective:       Vitals:        Last 24hrs VS reviewed since prior progress note.  Most recent are:    Visit Vitals    /80    Pulse 67    Temp 98.2 °F (36.8 °C)    Resp 16    Ht 6' (1.829 m)    Wt 210 lb 8.6 oz (95.5 kg)    SpO2 95%    BMI 28.55 kg/m2     SpO2 Readings from Last 6 Encounters:   07/27/17 95%   12/09/15 95%   10/30/15 99%   10/23/15 99%   10/05/15 97%   09/22/15 100%        No intake or output data in the 24 hours ending 07/27/17 0602     Telemetry reviewed:   normal sinus rhythm  Tubes:   N/A  X-Ray:  N/A   Procedures:   Transfusion of PRBC's x2    Exam:     General   well developed, well nourished, appears stated age, in no acute distress  Neck   Supple without nodes or mass. No thyromegaly or bruit  Respiratory   Clear To Auscultation bilaterally - no wheezes, rales, rhonchi, or crackles  Cardiology  Regular Rate and Rythmn  - no murmurs, rubs or gallops  Abdominal  Soft, non-tender, non-distended, positive bowel sounds, no hepatosplenomegaly  Extremities  No clubbing, cyanosis, or edema. Pulses intact. Skin  Normal skin turgor. No rashes or skin ulcers noted  Neurological  No focal neurological deficits noted  Psychological  Oriented x 3. Normal affect.   Exam otherwise negative     Lab Data Reviewed: (see below)    Medications Reviewed: (see below)    ______________________________________________________________________    Medications:     Current Facility-Administered Medications   Medication Dose Route Frequency    0.9% sodium chloride infusion 250 mL  250 mL IntraVENous PRN    sodium chloride (NS) flush 5-10 mL  5-10 mL IntraVENous Q8H    sodium chloride (NS) flush 5-10 mL  5-10 mL IntraVENous PRN    acetaminophen (TYLENOL) tablet 650 mg  650 mg Oral Q4H PRN    ondansetron (ZOFRAN) injection 4 mg  4 mg IntraVENous Q6H PRN    dilTIAZem CD (CARDIZEM CD) capsule 120 mg  120 mg Oral 7am    pantoprazole (PROTONIX) tablet 40 mg  40 mg Oral ACB&D    ondansetron (ZOFRAN ODT) tablet 4 mg  4 mg Oral Q6H PRN    diphenhydrAMINE (BENADRYL) capsule 25 mg  25 mg Oral Q6H PRN    insulin lispro (HUMALOG) injection   SubCUTAneous AC&HS    glucose chewable tablet 16 g  4 Tab Oral PRN    dextrose (D50W) injection syrg 12.5-25 g  12.5-25 g IntraVENous PRN    glucagon (GLUCAGEN) injection 1 mg  1 mg IntraMUSCular PRN    lisinopril (PRINIVIL, ZESTRIL) tablet 20 mg  20 mg Oral DAILY    0.9% sodium chloride infusion  125 mL/hr IntraVENous CONTINUOUS            Lab Review:     Recent Labs      07/27/17   0503  07/26/17   1451   WBC  7.4  7.2   HGB  6.6*  5.5*   HCT  23.4*  20.8*   PLT  287  316     Recent Labs      07/27/17   0503  07/26/17   1451   NA  135*  134*   K  4.0  4.4   CL  104  101   CO2  24  25   GLU  216*  235*   BUN  15  14   CREA  0.87  1.02   CA  8.3*  8.8   MG  2.0  1.4*   PHOS  3.3   --    ALB   --   3.5   TBILI   --   0.5   SGOT   --   8*   ALT   --   13   INR   --   1.2*     Lab Results   Component Value Date/Time    Glucose (POC) 205 07/26/2017 10:59 PM    Glucose (POC) 170 12/09/2015 11:41 AM    Glucose (POC) 137 12/09/2015 07:08 AM    Glucose (POC) 145 12/08/2015 09:56 PM    Glucose (POC) 181 12/08/2015 04:47 PM     No results for input(s): PH, PCO2, PO2, HCO3, FIO2 in the last 72 hours. Recent Labs      07/26/17   1451   INR  1.2*            Assessment:     Principal Problem:    Gastrointestinal hemorrhage with melena (7/27/2017)    Active Problems:    Spinal stenosis, lumbar region, with neurogenic claudication (10/21/2014)      Chronic blood loss anemia (7/26/2017)      Anticoagulant long-term use (7/26/2017)      Paroxysmal atrial fibrillation ([facility] (7/27/2017)      Uncontrolled diabetes mellitus type 2 without complications ([facility] (5/22/5834)      Hypomagnesemia (7/27/2017)      Heme positive stool (7/26/2017)           Plan:     Risk of deterioration: high             1. Transfuse 2 more units of PRBC's   2. Follow hgb   3. Continue IV PPI  4. On clear liquids  5. SSI coverage  6. Reduce IV fluids  7. GI to see - will need EGD +/- colonoscopy  8.  Ask Dr Norberto Shah to see regarding afib and [facility]                     Care Plan discussed with: Patient and Nursing Staff    Discussed:  Care Plan    Prophylaxis:  SCD's and H2B/PPI    Disposition:  Home w/Family           ___________________________________________________    Attending Physician: Mary Ann Weldon MD

## 2017-07-28 VITALS
OXYGEN SATURATION: 95 % | SYSTOLIC BLOOD PRESSURE: 155 MMHG | HEART RATE: 63 BPM | RESPIRATION RATE: 18 BRPM | BODY MASS INDEX: 28.52 KG/M2 | DIASTOLIC BLOOD PRESSURE: 77 MMHG | TEMPERATURE: 98 F | WEIGHT: 210.54 LBS | HEIGHT: 72 IN

## 2017-07-28 LAB
ABO + RH BLD: NORMAL
ANION GAP BLD CALC-SCNC: 3 MMOL/L (ref 5–15)
BLD PROD TYP BPU: NORMAL
BLOOD GROUP ANTIBODIES SERPL: NORMAL
BPU ID: NORMAL
BUN SERPL-MCNC: 12 MG/DL (ref 6–20)
BUN/CREAT SERPL: 14 (ref 12–20)
CALCIUM SERPL-MCNC: 8.5 MG/DL (ref 8.5–10.1)
CHLORIDE SERPL-SCNC: 105 MMOL/L (ref 97–108)
CO2 SERPL-SCNC: 28 MMOL/L (ref 21–32)
CREAT SERPL-MCNC: 0.87 MG/DL (ref 0.7–1.3)
CROSSMATCH RESULT,%XM: NORMAL
ERYTHROCYTE [DISTWIDTH] IN BLOOD BY AUTOMATED COUNT: 24 % (ref 11.5–14.5)
GLUCOSE BLD STRIP.AUTO-MCNC: 154 MG/DL (ref 65–100)
GLUCOSE SERPL-MCNC: 125 MG/DL (ref 65–100)
HCT VFR BLD AUTO: 28.9 % (ref 36.6–50.3)
HGB BLD-MCNC: 8.5 G/DL (ref 12.1–17)
MAGNESIUM SERPL-MCNC: 1.7 MG/DL (ref 1.6–2.4)
MCH RBC QN AUTO: 20.1 PG (ref 26–34)
MCHC RBC AUTO-ENTMCNC: 29.4 G/DL (ref 30–36.5)
MCV RBC AUTO: 68.3 FL (ref 80–99)
NRBC # BLD: 0.04 K/UL (ref 0–0.01)
NRBC BLD-RTO: 0.4 PER 100 WBC
PLATELET # BLD AUTO: 286 K/UL (ref 150–400)
POTASSIUM SERPL-SCNC: 4.1 MMOL/L (ref 3.5–5.1)
RBC # BLD AUTO: 4.23 M/UL (ref 4.1–5.7)
SERVICE CMNT-IMP: ABNORMAL
SODIUM SERPL-SCNC: 136 MMOL/L (ref 136–145)
SPECIMEN EXP DATE BLD: NORMAL
STATUS OF UNIT,%ST: NORMAL
UNIT DIVISION, %UDIV: 0
WBC # BLD AUTO: 8.4 K/UL (ref 4.1–11.1)
WBC NRBC COR # BLD: ABNORMAL 10*3/UL

## 2017-07-28 PROCEDURE — 74011636637 HC RX REV CODE- 636/637: Performed by: HOSPITALIST

## 2017-07-28 PROCEDURE — 83735 ASSAY OF MAGNESIUM: CPT | Performed by: INTERNAL MEDICINE

## 2017-07-28 PROCEDURE — 80048 BASIC METABOLIC PNL TOTAL CA: CPT | Performed by: INTERNAL MEDICINE

## 2017-07-28 PROCEDURE — 85027 COMPLETE CBC AUTOMATED: CPT | Performed by: INTERNAL MEDICINE

## 2017-07-28 PROCEDURE — 36415 COLL VENOUS BLD VENIPUNCTURE: CPT | Performed by: INTERNAL MEDICINE

## 2017-07-28 PROCEDURE — 74011250637 HC RX REV CODE- 250/637: Performed by: HOSPITALIST

## 2017-07-28 PROCEDURE — 82962 GLUCOSE BLOOD TEST: CPT

## 2017-07-28 RX ORDER — SUCRALFATE 1 G/10ML
2 SUSPENSION ORAL
Qty: 160 ML | Refills: 0 | Status: ON HOLD | OUTPATIENT
Start: 2017-07-28 | End: 2017-11-14 | Stop reason: CLARIF

## 2017-07-28 RX ORDER — PANTOPRAZOLE SODIUM 40 MG/1
40 TABLET, DELAYED RELEASE ORAL
Qty: 60 TAB | Refills: 0 | Status: SHIPPED | OUTPATIENT
Start: 2017-07-28 | End: 2017-08-21 | Stop reason: SDUPTHER

## 2017-07-28 RX ORDER — LISINOPRIL 20 MG/1
20 TABLET ORAL DAILY
Qty: 30 TAB | Refills: 3 | Status: SHIPPED | OUTPATIENT
Start: 2017-07-28 | End: 2017-11-28 | Stop reason: SDUPTHER

## 2017-07-28 RX ADMIN — PANTOPRAZOLE SODIUM 40 MG: 40 TABLET, DELAYED RELEASE ORAL at 06:14

## 2017-07-28 RX ADMIN — DILTIAZEM HYDROCHLORIDE 120 MG: 120 CAPSULE, EXTENDED RELEASE ORAL at 06:14

## 2017-07-28 RX ADMIN — LISINOPRIL 20 MG: 20 TABLET ORAL at 07:16

## 2017-07-28 RX ADMIN — Medication 10 ML: at 06:15

## 2017-07-28 RX ADMIN — INSULIN LISPRO 3 UNITS: 100 INJECTION, SOLUTION INTRAVENOUS; SUBCUTANEOUS at 07:15

## 2017-07-28 NOTE — ROUTINE PROCESS
Bedside shift change report given to *** (oncoming nurse) by Isamra Gonzalez (offgoing nurse).  Report included the following information [facility].     [facility] Phone:   [phone]        Significant changes during shift: none           Patient Information     South Silva  57 y.o.  7/26/2017  1:54 PM by Brielle Craig MD. South Silva was admitted from Home     Problem List          Patient Active Problem List     Diagnosis Date Noted    Gastrointestinal hemorrhage with melena 07/27/2017    Paroxysmal atrial fibrillation ([facility] 07/27/2017    Uncontrolled diabetes mellitus type 2 without complications ([facility] 25/14/2643    Hypomagnesemia 07/27/2017    Iron deficiency anemia 07/26/2017    Chronic blood loss anemia 07/26/2017    Anticoagulant long-term use 07/26/2017    Heme positive stool 07/26/2017    Post-operative infection 10/25/2015    Back pain 10/25/2015    Discitis of lumbar region 10/25/2015    Cholelithiasis 10/05/2015    Symptomatic cholelithiasis 09/15/2015    Osteomyelitis ([facility] 08/31/2015    Osteomyelitis of lumbar spine ([facility] 08/20/2015    Adrenal mass, left (HCC) 08/14/2015    Constipation 08/14/2015    Midline low back pain without sciatica 08/14/2015    Spinal stenosis, lumbar region, with neurogenic claudication 10/21/2014    Sciatica 10/21/2014           Past Medical History:   Diagnosis Date    Anemia      Arthritis       fingers    Atrial fibrillation (HCC)      Atrial flutter (HCC)      Bowel trouble      Chronic pain      Diabetes (HCC)      GERD (gastroesophageal reflux disease)      Hypercholesteremia      Hypertension      SVT (supraventricular tachycardia) ([facility])       Dr Shanon Espino    Unspecified sleep apnea              Core Measures:     CVA: No No  CHF:No No  PNA:No No        Activity Status:     OOB to Chair No  Ambulated this shift Yes   Bed Rest No     Supplemental O2: (If Applicable)     NC No  NRB No  Venti-mask No           LINES AND DRAINS:     No lines or drains except peripheral IV     DVT prophylaxis:     DVT prophylaxis Med- No  DVT prophylaxis SCD or CLARICE- No      Wounds: (If Applicable)     Wounds- No        Patient Safety:     Falls Score Total Score: 1  Safety Level_______  Bed Alarm On? No  Sitter?  No     Plan for upcoming shift: Possibly going home, have colonoscopy outpatient, possible ablation by Dr Alice Awan, get off Eliquis           Discharge Plan: No      Active Consults:  IP CONSULT TO GASTROENTEROLOGY  IP CONSULT TO GASTROENTEROLOGY  IP CONSULT TO CARDIOLOGY

## 2017-07-28 NOTE — PROGRESS NOTES
Problem: Anemia Care Plan (Adult and Pediatric)  Goal: *Tolerates increased activity  Outcome: Progressing Towards Goal  RA, denies SOB

## 2017-07-28 NOTE — PROGRESS NOTES
Pt is a 57 y.o  male admitted with Iron Deficiency Anemia. Pt was alert, oriented and in no distress. Demographic information verified and all is correct. Pt lives with his wife in a 2 story home with steps. Pt denies using DME or having home health and rehab. Prior to admission, pt was independent with his ADL's, IADL's, and drives. Preferred pharmacy is [facility] [address]. Pt's wife will transport at discharge. F/u appointment made and documented on discharge paperwork. Pt in good spirits and ready for discharge. Care Management Interventions  PCP Verified by CM: Yes (Dr. Irena Love)  Mode of Transport at Discharge:  Other (see comment) (pt's wife will transport by car)  Hospital Transport Time of Discharge: 1000  Transition of Care Consult (CM Consult): Discharge Planning  Discharge Durable Medical Equipment: No  Physical Therapy Consult: No  Occupational Therapy Consult: No  Speech Therapy Consult: No  Current Support Network: Lives with Spouse, Own Home (lives with his wife in a 2 story home with steps to the entrance)  Confirm Follow Up Transport: Family  Plan discussed with Pt/Family/Caregiver: Yes  Discharge Location  Discharge Placement: [address] [place], [address] [place]

## 2017-07-28 NOTE — PROGRESS NOTES
Problem: Falls - Risk of  Goal: *Absence of falls  Outcome: Progressing Towards Goal  Pt alert, oriented x 4, up ad mavis. Denies dizziness, lightheadedness when up. Will draw hemoglobin with AM labs.

## 2017-07-28 NOTE — PROGRESS NOTES
Gastroenterology Progress Note    7/28/2017    Admit Date: 7/26/2017    Subjective: Follow up for: melena,anemia      Feels ok. No new issues. Being dced. Hgb is better. Current Facility-Administered Medications   Medication Dose Route Frequency    0.9% sodium chloride infusion 250 mL  250 mL IntraVENous PRN    0.9% sodium chloride infusion 250 mL  250 mL IntraVENous PRN    sodium chloride (NS) flush 5-10 mL  5-10 mL IntraVENous Q8H    sodium chloride (NS) flush 5-10 mL  5-10 mL IntraVENous PRN    acetaminophen (TYLENOL) tablet 650 mg  650 mg Oral Q4H PRN    ondansetron (ZOFRAN) injection 4 mg  4 mg IntraVENous Q6H PRN    dilTIAZem CD (CARDIZEM CD) capsule 120 mg  120 mg Oral 7am    pantoprazole (PROTONIX) tablet 40 mg  40 mg Oral ACB&D    ondansetron (ZOFRAN ODT) tablet 4 mg  4 mg Oral Q6H PRN    diphenhydrAMINE (BENADRYL) capsule 25 mg  25 mg Oral Q6H PRN    insulin lispro (HUMALOG) injection   SubCUTAneous AC&HS    glucose chewable tablet 16 g  4 Tab Oral PRN    dextrose (D50W) injection syrg 12.5-25 g  12.5-25 g IntraVENous PRN    glucagon (GLUCAGEN) injection 1 mg  1 mg IntraMUSCular PRN    lisinopril (PRINIVIL, ZESTRIL) tablet 20 mg  20 mg Oral DAILY        Objective:     Blood pressure 155/77, pulse 63, temperature 98 °F (36.7 °C), resp. rate 18, height 6' (1.829 m), weight 95.5 kg (210 lb 8.6 oz), SpO2 95 %.         07/26 1901 - 07/28 0700  In: 18 [P.O.:240]  Out: -         Physical Examination:       General:AAO x 3,   HEENT:  EOMI, MMM  Chest:  CTA, No rhonchi, rales or rubs. Heart: S1, S2, RRR  GI: Soft, NT, ND + bowel sounds  l.     Data Review    Recent Results (from the past 24 hour(s))   GLUCOSE, POC    Collection Time: 07/27/17 11:15 AM   Result Value Ref Range    Glucose (POC) 245 (H) 65 - 100 mg/dL    Performed by Maile Pollard (PCT)    GLUCOSE, POC    Collection Time: 07/27/17  4:15 PM   Result Value Ref Range    Glucose (POC) 208 (H) 65 - 100 mg/dL Performed by Avery Cr (PCT)    GLUCOSE, POC    Collection Time: 07/27/17  9:18 PM   Result Value Ref Range    Glucose (POC) 153 (H) 65 - 100 mg/dL    Performed by MIRIAM BALDWIN    CBC W/O DIFF    Collection Time: 07/28/17  3:11 AM   Result Value Ref Range    WBC 8.4 4.1 - 11.1 K/uL    RBC 4.23 4. 10 - 5.70 M/uL    HGB 8.5 (L) 12.1 - 17.0 g/dL    HCT 28.9 (L) 36.6 - 50.3 %    MCV 68.3 (L) 80.0 - 99.0 FL    MCH 20.1 (L) 26.0 - 34.0 PG    MCHC 29.4 (L) 30.0 - 36.5 g/dL    RDW 24.0 (H) 11.5 - 14.5 %    PLATELET 760 357 - 433 K/uL   METABOLIC PANEL, BASIC    Collection Time: 07/28/17  3:11 AM   Result Value Ref Range    Sodium 136 136 - 145 mmol/L    Potassium 4.1 3.5 - 5.1 mmol/L    Chloride 105 97 - 108 mmol/L    CO2 28 21 - 32 mmol/L    Anion gap 3 (L) 5 - 15 mmol/L    Glucose 125 (H) 65 - 100 mg/dL    BUN 12 6 - 20 MG/DL    Creatinine 0.87 0.70 - 1.30 MG/DL    BUN/Creatinine ratio 14 12 - 20      GFR est AA >60 >60 ml/min/1.73m2    GFR est non-AA >60 >60 ml/min/1.73m2    Calcium 8.5 8.5 - 10.1 MG/DL   MAGNESIUM    Collection Time: 07/28/17  3:11 AM   Result Value Ref Range    Magnesium 1.7 1.6 - 2.4 mg/dL   NUCLEATED RBC    Collection Time: 07/28/17  3:11 AM   Result Value Ref Range    NRBC 0.4 (H) 0  WBC    ABSOLUTE NRBC 0.04 (H) 0.00 - 0.01 K/uL    WBC CORRECTED FOR NR ADJUSTED FOR NUCLEATED RBC'S     GLUCOSE, POC    Collection Time: 07/28/17  6:00 AM   Result Value Ref Range    Glucose (POC) 154 (H) 65 - 100 mg/dL    Performed by Jose G Rico (PCT)      Recent Labs      07/28/17   0311  07/27/17   0503   WBC  8.4  7.4   HGB  8.5*  6.6*   HCT  28.9*  23.4*   PLT  286  287     Recent Labs      07/28/17   0311  07/27/17   0503  07/26/17   1451   NA  136  135*  134*   K  4.1  4.0  4.4   CL  105  104  101   CO2  28  24  25   BUN  12  15  14   CREA  0.87  0.87  1.02   GLU  125*  216*  235*   CA  8.5  8.3*  8.8   MG  1.7  2.0  1.4*   PHOS   --   3.3   --      Recent Labs      07/27/17   0005  07/26/17 1451   SGOT   --   8*   AP   --   98   TP   --   7.0   ALB   --   3.5   GLOB   --   3.5   LPSE  102   --      Recent Labs      07/26/17   1451   INR  1.2*   PTP  12.3*      Recent Labs      07/26/17   1746   TIBC  464*   PSAT  4*   FERR  2*      Lab Results   Component Value Date/Time    Folate 6.4 08/24/2015 03:05 AM      No results for input(s): PH, PCO2, PO2 in the last 72 hours. Recent Labs      07/26/17   1451   TROIQ  <0.04     Lab Results   Component Value Date/Time    Cholesterol, total 106 10/26/2010 05:40 AM    HDL Cholesterol 27 10/26/2010 05:40 AM    LDL, calculated 28.8 10/26/2010 05:40 AM    Triglyceride 251 10/26/2010 05:40 AM    CHOL/HDL Ratio 3.9 10/26/2010 05:40 AM     No components found for: [place]  Lab Results   Component Value Date/Time    Color YELLOW/STRAW 07/27/2017 06:11 AM    Appearance CLEAR 07/27/2017 06:11 AM    Specific gravity 1.022 07/27/2017 06:11 AM    Specific gravity >1.030 12/29/2009 11:25 PM    pH (UA) 7.0 07/27/2017 06:11 AM    Protein NEGATIVE  07/27/2017 06:11 AM    Glucose 250 07/27/2017 06:11 AM    Ketone TRACE 07/27/2017 06:11 AM    Bilirubin NEGATIVE  07/27/2017 06:11 AM    Urobilinogen 2.0 07/27/2017 06:11 AM    Nitrites NEGATIVE  07/27/2017 06:11 AM    Leukocyte Esterase NEGATIVE  07/27/2017 06:11 AM    Epithelial cells FEW 10/25/2015 02:35 PM    Bacteria NEGATIVE  10/25/2015 02:35 PM    WBC 0-4 10/25/2015 02:35 PM    RBC 0-5 10/25/2015 02:35 PM        ROS: -CP, SOB, Dysuria, palpitations, cough.     Assessment:    Principal Problem:    Gastrointestinal hemorrhage with melena (7/27/2017)    Active Problems:    Spinal stenosis, lumbar region, with neurogenic claudication (10/21/2014)      Chronic blood loss anemia (7/26/2017)      Anticoagulant long-term use (7/26/2017)      Heme positive stool (7/26/2017)      Paroxysmal atrial fibrillation ([facility] (7/27/2017)      Uncontrolled diabetes mellitus type 2 without complications ([facility] (8/24/1729)      Hypomagnesemia (7/27/2017)             Plan/Discussion:     1. I agree with  EGD/colonoscopy but this will likely get done after the weekend, off of Eliquis. 2. We will arrange for EGD/colonoscopy as OP next week. Signed By: Cynthia Corrales.  Jayla Morales MD    7/28/2017  7:19 AM

## 2017-07-28 NOTE — PROGRESS NOTES
2300- Patient resting in bed. Denies any pain. Bedside shift change report given to Dayne Lopez (oncoming nurse) by Tramaine Fox (offgoing nurse). Report included the following information SBAR, Kardex, Procedure Summary, Intake/Output, MAR, Recent Results and Cardiac Rhythm NSR/SB.

## 2017-07-28 NOTE — PROGRESS NOTES
[address] [place] [address]  [phone]      Medical Progress Note      NAME: Manuel Moe   :  1955  MRM:  [id number]    Date/Time: 2017  5:56 AM         Subjective:     Feels better overall. Has some upper abdominal discomfort that gets better when he eats. No active bleeding. Hgb 8.5 with creat 0.87. Wants to go home. Past Medical History:   Diagnosis Date    Anemia     Arthritis     fingers    Atrial fibrillation (HCC)     Atrial flutter (HCC)     Bowel trouble     Chronic pain     Diabetes (HCC)     GERD (gastroesophageal reflux disease)     Hypercholesteremia     Hypertension     SVT [facility]ra[facility]ricular tachycardia) ([facility])     Dr Hodgson Homes    Unspecified sleep apnea        ROS:  General: negative for fever, chills, sweats, weakness  Ear Nose and Throat: negative for rhinorrhea, pharyngitis, otalgia, tinnitus, speech or swallowing difficulties  Respiratory:  negative for cough, sputum production, SOB, wheezing, RIOS, pleuritic pain  Cardiology:  negative for chest pain, palpitations, orthopnea, PND, edema, syncope   Gastrointestinal: negative for abdominal pain, N/V, dysphagia, change in bowel habits, bleeding  Genitourinary: negative for frequency, urgency, dysuria, hematuria, incontinence  Muskuloskeletal : positive for chronic back pain  Dermatological: negative for rash, ulceration, mole change, new lesion  Neurological: negative for headache, dizziness, confusion, focal weakness, paresthesia, memory loss, gait disturbance  Psychological: negative for anxiety, depression, agitation  Review of systems otherwise negative         Objective:       Vitals:        Last 24hrs VS reviewed since prior progress note.  Most recent are:    Visit Vitals    /70 (BP 1 Location: Right arm, BP Patient Position: At rest)    Pulse (!) 59    Temp 97.9 °F (36.6 °C)    Resp 18    Ht 6' (1.829 m)    Wt 210 lb 8.6 oz (95.5 kg)    SpO2 100%    BMI 28.55 kg/m2     SpO2 Readings from Last 6 Encounters:   07/28/17 100%   12/09/15 95%   10/30/15 99%   10/23/15 99%   10/05/15 97%   09/22/15 100%            Intake/Output Summary (Last 24 hours) at 07/28/17 0556  Last data filed at 07/27/17 1740   Gross per 24 hour   Intake              570 ml   Output                0 ml   Net              570 ml        Telemetry reviewed:   normal sinus rhythm  Tubes:   N/A  X-Ray:  N/A   Procedures:   Transfusion of PRBC's x4    Exam:     General   well developed, well nourished, appears stated age, in no acute distress  Neck   Supple without nodes or mass. No thyromegaly or bruit  Respiratory   Clear To Auscultation bilaterally - no wheezes, rales, rhonchi, or crackles  Cardiology  Regular Rate and Rythmn  - no murmurs, rubs or gallops  Abdominal  Soft, non-tender, non-distended, positive bowel sounds, no hepatosplenomegaly  Extremities  No clubbing, cyanosis, or edema. Pulses intact. Skin  Normal skin turgor. No rashes or skin ulcers noted  Neurological  No focal neurological deficits noted  Psychological  Oriented x 3. Normal affect.   Exam otherwise negative     Lab Data Reviewed: (see below)    Medications Reviewed: (see below)    ______________________________________________________________________    Medications:     Current Facility-Administered Medications   Medication Dose Route Frequency    0.9% sodium chloride infusion 250 mL  250 mL IntraVENous PRN    0.9% sodium chloride infusion 250 mL  250 mL IntraVENous PRN    sodium chloride (NS) flush 5-10 mL  5-10 mL IntraVENous Q8H    sodium chloride (NS) flush 5-10 mL  5-10 mL IntraVENous PRN    acetaminophen (TYLENOL) tablet 650 mg  650 mg Oral Q4H PRN    ondansetron (ZOFRAN) injection 4 mg  4 mg IntraVENous Q6H PRN    dilTIAZem CD (CARDIZEM CD) capsule 120 mg  120 mg Oral 7am    pantoprazole (PROTONIX) tablet 40 mg  40 mg Oral ACB&D    ondansetron (ZOFRAN ODT) tablet 4 mg  4 mg Oral Q6H PRN  diphenhydrAMINE (BENADRYL) capsule 25 mg  25 mg Oral Q6H PRN    insulin lispro (HUMALOG) injection   SubCUTAneous AC&HS    glucose chewable tablet 16 g  4 Tab Oral PRN    dextrose (D50W) injection syrg 12.5-25 g  12.5-25 g IntraVENous PRN    glucagon (GLUCAGEN) injection 1 mg  1 mg IntraMUSCular PRN    lisinopril (PRINIVIL, ZESTRIL) tablet 20 mg  20 mg Oral DAILY            Lab Review:     Recent Labs      07/28/17   0311  07/27/17   0503  07/26/17   1451   WBC  8.4  7.4  7.2   HGB  8.5*  6.6*  5.5*   HCT  28.9*  23.4*  20.8*   PLT  286  287  316     Recent Labs      07/28/17   0311  07/27/17   0503  07/26/17   1451   NA  136  135*  134*   K  4.1  4.0  4.4   CL  105  104  101   CO2  28  24  25   GLU  125*  216*  235*   BUN  12  15  14   CREA  0.87  0.87  1.02   CA  8.5  8.3*  8.8   MG  1.7  2.0  1.4*   PHOS   --   3.3   --    ALB   --    --   3.5   TBILI   --    --   0.5   SGOT   --    --   8*   ALT   --    --   13   INR   --    --   1.2*     Lab Results   Component Value Date/Time    Glucose (POC) 153 07/27/2017 09:18 PM    Glucose (POC) 208 07/27/2017 04:15 PM    Glucose (POC) 245 07/27/2017 11:15 AM    Glucose (POC) 229 07/27/2017 06:30 AM    Glucose (POC) 205 07/26/2017 10:59 PM     No results for input(s): PH, PCO2, PO2, HCO3, FIO2 in the last 72 hours. Recent Labs      07/26/17   1451   INR  1.2*            Assessment:     Principal Problem:    Gastrointestinal hemorrhage with melena (7/27/2017)    Active Problems:    Spinal stenosis, lumbar region, with neurogenic claudication (10/21/2014)      Chronic blood loss anemia (7/26/2017)      Anticoagulant long-term use (7/26/2017)      Paroxysmal atrial fibrillation ([facility] (7/27/2017)      Uncontrolled diabetes mellitus type 2 without complications ([facility] (2/69/2227)      Hypomagnesemia (7/27/2017)      Heme positive stool (7/26/2017)           Plan:     Risk of deterioration: high             1. Continue BID PPI  2. GI Lite diet  3.  Will treat also with carafate over weekend  4. EGD/colonoscopy next week per Dr Gwen Flood  5.  Home today                     Care Plan discussed with: Patient and Nursing Staff    Discussed:  Care Plan    Prophylaxis:  SCD's and H2B/PPI    Disposition:  Home w/Family           ___________________________________________________    Attending Physician: Ulices Renee MD

## 2017-07-28 NOTE — DISCHARGE INSTRUCTIONS
[facility]  [address]  [phone]      Patient Discharge Instructions    Dannielle Serrano / [id number] : 1955    Admitted 2017 Discharged: 2017     Take Home Medications        · It is important that you take the medication exactly as they are prescribed. · Keep your medication in the bottles provided by the pharmacist and keep a list of the medication names, dosages, and times to be taken in your wallet. · Do not take other medications without consulting your doctor. What to do at Home    Recommended diet: Regular Diet,     Recommended activity: Activity as tolerated,     Follow-up with Dr Elza House in 1 week and Dr Ben Ren for endoscopy and colonoscopy as instructed        Information obtained by :  I understand that if any problems occur once I am at home I am to contact my physician. I understand and acknowledge receipt of the instructions indicated above.                                                                                                                                            Physician's or R.N.'s Signature                                                                  Date/Time                                                                                                                                              Patient or Representative Signature                                                          Date/Time

## 2017-07-28 NOTE — PROGRESS NOTES
DC instructions reviewed with patient, f/u next week. Pt verbalizes understanding of  all information provided and is waiting for wife  to pick him up.

## 2017-07-28 NOTE — DISCHARGE SUMMARY
[facility], [place], [address] [facility]  [phone]    Hospital Discharge Summary        Patient ID:  Dannielle Serrano  [id number]  50 y.o.  1955    Admit date: 7/26/2017  Discharge date and time: 7/28/2017    Admission Diagnoses: Iron deficiency anemia     Discharge Diagnoses:     Principal Problem:    Gastrointestinal hemorrhage with melena (7/27/2017)    Active Problems:    Spinal stenosis, lumbar region, with neurogenic claudication (10/21/2014)      Chronic blood loss anemia (7/26/2017)      Anticoagulant long-term use (7/26/2017)      Paroxysmal atrial fibrillation (HCC) (7/27/2017)      Uncontrolled diabetes mellitus type 2 without complications [facility] (7/28/8406)      Hypomagnesemia (7/27/2017)      Heme positive stool (7/26/2017)         Past Medical History:   Diagnosis Date    Anemia     Arthritis     fingers    Atrial fibrillation (HCC)     Atrial flutter (HCC)     Bowel trouble     Chronic pain     Diabetes (HCC)     GERD (gastroesophageal reflux disease)     Hypercholesteremia     Hypertension     SVT (supraventricular tachycardia) (HCC)     Dr Beverly Notice    Unspecified sleep apnea          PCP: Arcelia Dejesus MD    Consults: Cardiology and GI      History of Present Illness: Per hospitalist -   Dannielle Serrano is a 57 y.o.  male with at least 6 months of progressive worsening fatigue, RIOS, dizziness. Also intermittent dark stools, epigastric abdomen pain with nausea. Denies significant weight changes. Feeling lightheaded and dizzy. On eliquis for several years and wife concerned he has been on it for too long. Sister in law on coumadin and self check INR with goal 2.5 to 3.5. Patient checked his INR on her machine and INR 1.1 which wife and patient thought was abnormal and came to ER.     We were asked to admit for work up and evaluation of the above problems.      Admission Physical Exam: Per hospitalist -   VITALS:         Visit Vitals    /61    Pulse 72    Temp 97.9 °F (36.6 °C)    Resp 16    Ht 6' (1.829 m)    Wt 95.5 kg (210 lb 8.6 oz)    SpO2 98%    BMI 28.55 kg/m2      Temp (24hrs), Av.9 °F (36.6 °C), Min:97.9 °F (36.6 °C), Max:97.9 °F (36.6 °C)     Body mass index is 28.55 kg/(m^2). PHYSICAL EXAM:     General:                    Pale, Alert, cooperative, no distress, appears stated age. HEENT:                     Atraumatic, anicteric sclerae, pink conjunctivae                                              No oral ulcers, mucosa moist, throat clear. Hearing intact. Neck:                                   Supple, symmetrical,  thyroid: non tender  Lungs:                       Clear to auscultation bilaterally. No Wheezing or Rhonchi. No rales. Chest wall:                No tenderness  No Accessory muscle use. Heart:                                  Regular  rhythm,  No  murmur   No gallop. No edema. Abdomen:                  Soft, non-tender. Not distended. Bowel sounds normal. No masses  Extremities:               No cyanosis. No clubbing  Skin:                                    Not pale Not Jaundiced  No rashes   Psych:                                 Good insight. Not depressed. Not anxious or agitated. Neurologic:                EOMs intact. No facial asymmetry. No aphasia or slurred speech. Symmetrical strength, Alert and oriented X 3. Admission Labs:  CBC: 2017: HCT 20.8 %* (Ref range: 36.6 - 50.3 %); HGB 5.5 g/dL* (Ref range: 12.1 - 17.0 g/dL); PLATELET 139 K/uL (Ref range: 150 - 400 K/uL); RBC 3.38 M/uL* (Ref range: 4.10 - 5.70 M/uL); WBC 7.2 K/uL (Ref range: 4.1 - 11.1 K/uL)  2017: HCT 23.4 %* (Ref range: 36.6 - 50.3 %);  HGB 6.6 g/dL* (Ref range: 12.1 - 17.0 g/dL); PLATELET 277 K/uL (Ref range: 150 - 400 K/uL); RBC 3.58 M/uL* (Ref range: 4.10 - 5.70 M/uL); WBC 7.4 K/uL (Ref range: 4.1 - 11.1 K/uL)   BMP: 2017: BUN 14 MG/DL (Ref range: 6 - 20 MG/DL); Calcium 8.8 MG/DL (Ref range: 8.5 - 10.1 MG/DL); Chloride 101 mmol/L (Ref range: 97 - 108 mmol/L); CO2 25 mmol/L (Ref range: 21 - 32 mmol/L); Creatinine 1.02 MG/DL (Ref range: 0.70 - 1.30 MG/DL); Glucose 235 mg/dL* (Ref range: 65 - 100 mg/dL); Potassium 4.4 mmol/L (Ref range: 3.5 - 5.1 mmol/L); Sodium 134 mmol/L* (Ref range: 136 - 145 mmol/L)  7/27/2017: BUN 15 MG/DL (Ref range: 6 - 20 MG/DL); Calcium 8.3 MG/DL* (Ref range: 8.5 - 10.1 MG/DL); Chloride 104 mmol/L (Ref range: 97 - 108 mmol/L); CO2 24 mmol/L (Ref range: 21 - 32 mmol/L); Creatinine 0.87 MG/DL (Ref range: 0.70 - 1.30 MG/DL); Glucose 216 mg/dL* (Ref range: 65 - 100 mg/dL); Potassium 4.0 mmol/L (Ref range: 3.5 - 5.1 mmol/L); Sodium 135 mmol/L* (Ref range: 136 - 145 mmol/L)  Coagulation: 7/26/2017: INR 1.2  * (Ref range: 0.9 - 1.1  ); Prothrombin time 12.3 sec* (Ref range: 9.0 - 11.1 sec)  Cardiac markers: No results found for requested labs within first 48 hours of the last admission day. Liver: 7/26/2017: Albumin 3.5 g/dL (Ref range: 3.5 - 5.0 g/dL); Alk. phosphatase 98 U/L (Ref range: 45 - 117 U/L); AST (SGOT) 8 U/L* (Ref range: 15 - 37 U/L);  Protein, total 7.0 g/dL (Ref range: 6.4 - 8.2 g/dL)  7/27/2017: Lipase 102 U/L (Ref range: 73 - 393 U/L)    Discharge Labs:  Recent Results (from the past 24 hour(s))   URINALYSIS W/ RFLX MICROSCOPIC    Collection Time: 07/27/17  6:11 AM   Result Value Ref Range    Color YELLOW/STRAW      Appearance CLEAR CLEAR      Specific gravity 1.022 1.003 - 1.030      pH (UA) 7.0 5.0 - 8.0      Protein NEGATIVE  NEG mg/dL    Glucose 250 (A) NEG mg/dL    Ketone TRACE (A) NEG mg/dL    Bilirubin NEGATIVE  NEG      Blood NEGATIVE  NEG      Urobilinogen 2.0 (H) 0.2 - 1.0 EU/dL    Nitrites NEGATIVE  NEG      Leukocyte Esterase NEGATIVE  NEG     GLUCOSE, POC    Collection Time: 07/27/17  6:30 AM   Result Value Ref Range    Glucose (POC) 229 (H) 65 - 100 mg/dL    Performed by Greg Rjoas (PCT)    GLUCOSE, POC    Collection Time: 07/27/17 11:15 AM   Result Value Ref Range    Glucose (POC) 245 (H) 65 - 100 mg/dL    Performed by Shawn Olivia (PCT)    GLUCOSE, POC    Collection Time: 07/27/17  4:15 PM   Result Value Ref Range    Glucose (POC) 208 (H) 65 - 100 mg/dL    Performed by Shawn Olivia (PCT)    GLUCOSE, POC    Collection Time: 07/27/17  9:18 PM   Result Value Ref Range    Glucose (POC) 153 (H) 65 - 100 mg/dL    Performed by MIRIAM BALDWIN    CBC W/O DIFF    Collection Time: 07/28/17  3:11 AM   Result Value Ref Range    WBC 8.4 4.1 - 11.1 K/uL    RBC 4.23 4. 10 - 5.70 M/uL    HGB 8.5 (L) 12.1 - 17.0 g/dL    HCT 28.9 (L) 36.6 - 50.3 %    MCV 68.3 (L) 80.0 - 99.0 FL    MCH 20.1 (L) 26.0 - 34.0 PG    MCHC 29.4 (L) 30.0 - 36.5 g/dL    RDW 24.0 (H) 11.5 - 14.5 %    PLATELET 783 755 - 053 K/uL   METABOLIC PANEL, BASIC    Collection Time: 07/28/17  3:11 AM   Result Value Ref Range    Sodium 136 136 - 145 mmol/L    Potassium 4.1 3.5 - 5.1 mmol/L    Chloride 105 97 - 108 mmol/L    CO2 28 21 - 32 mmol/L    Anion gap 3 (L) 5 - 15 mmol/L    Glucose 125 (H) 65 - 100 mg/dL    BUN 12 6 - 20 MG/DL    Creatinine 0.87 0.70 - 1.30 MG/DL    BUN/Creatinine ratio 14 12 - 20      GFR est AA >60 >60 ml/min/1.73m2    GFR est non-AA >60 >60 ml/min/1.73m2    Calcium 8.5 8.5 - 10.1 MG/DL   MAGNESIUM    Collection Time: 07/28/17  3:11 AM   Result Value Ref Range    Magnesium 1.7 1.6 - 2.4 mg/dL   NUCLEATED RBC    Collection Time: 07/28/17  3:11 AM   Result Value Ref Range    NRBC 0.4 (H) 0  WBC    ABSOLUTE NRBC 0.04 (H) 0.00 - 0.01 K/uL    WBC CORRECTED FOR NR ADJUSTED FOR NUCLEATED RBC'S         Significant Diagnostic Studies:   Transfusion of PRBC's x4    Hospital Course: He was admitted to telemetry, put on clear liquids and IV PPI BID. GI and cardiology were consulted. Elliquis was discontinued. He was transfused 4 units of PRBC's to a hgb of 8.5.  [facility] [place] and will revisit this along with possible future ablation. Dr Makeda Silva recommended EGD/colonoscopy done after several days off eliquis. Patient had no active bleeding and was discharged home stable on PPI therapy and carafate until the noted procedures can be done after the weekend early next week. Disposition: home    Activity: as tolerated     Diet: GI Litre    Follow up appointment: Dr Nila Alonzo in 1 week. Dr Makeda Silva to arrange outpatient EGD/colonoscopy in the next 3-5 days     Patient Instructions:   Current Discharge Medication List      START taking these medications    Details   lisinopril (PRINIVIL, ZESTRIL) 20 mg tablet Take 1 Tab by mouth daily. Qty: 30 Tab, Refills: 3      pantoprazole (PROTONIX) 40 mg tablet Take 1 Tab by mouth Before breakfast and dinner. Qty: 60 Tab, Refills: 0      sucralfate (CARAFATE) 100 mg/mL suspension Take 10 mL by mouth Before breakfast, lunch, dinner and at bedtime. Qty: 160 mL, Refills: 0         CONTINUE these medications which have NOT CHANGED    Details   ondansetron (ZOFRAN ODT) 4 mg disintegrating tablet Take 1 Tab by mouth every eight (8) hours as needed for Nausea. Qty: 20 Tab, Refills: 0      metFORMIN (GLUCOPHAGE) 1,000 mg tablet Take 1,000 mg by mouth two (2) times daily (with meals). diltiazem CD (CARDIZEM CD) 120 mg ER capsule Take 120 mg by mouth every morning.  Indications: HYPERTENSION         STOP taking these medications       omeprazole (PRILOSEC) 20 mg capsule Comments:   Reason for Stopping:         apixaban (ELIQUIS) 5 mg tablet Comments:   Reason for Stopping:               Wound Care: None needed    Signed:  Alexis Taylor MD  7/28/2017  6:00 AM

## 2017-08-10 ENCOUNTER — OFFICE VISIT (OUTPATIENT)
Dept: INTERNAL MEDICINE CLINIC | Age: 62
End: 2017-08-10

## 2017-08-10 VITALS
BODY MASS INDEX: 27.77 KG/M2 | DIASTOLIC BLOOD PRESSURE: 62 MMHG | WEIGHT: 205 LBS | HEIGHT: 72 IN | HEART RATE: 68 BPM | SYSTOLIC BLOOD PRESSURE: 118 MMHG

## 2017-08-10 DIAGNOSIS — K92.1 GASTROINTESTINAL HEMORRHAGE WITH MELENA: Primary | ICD-10-CM

## 2017-08-10 DIAGNOSIS — I48.0 PAROXYSMAL ATRIAL FIBRILLATION (HCC): ICD-10-CM

## 2017-08-10 DIAGNOSIS — Z79.01 ANTICOAGULANT LONG-TERM USE: ICD-10-CM

## 2017-08-10 DIAGNOSIS — D50.0 CHRONIC BLOOD LOSS ANEMIA: ICD-10-CM

## 2017-08-10 LAB
ALBUMIN SERPL-MCNC: 4.4 G/DL (ref 3.9–5.4)
ALKALINE PHOS POC: 110 U/L (ref 38–126)
ALT SERPL-CCNC: 22 U/L (ref 9–52)
AST SERPL-CCNC: 119 U/L (ref 14–36)
BUN BLD-MCNC: 15 MG/DL (ref 9–20)
CALCIUM BLD-MCNC: 9.8 MG/DL (ref 8.4–10.2)
CHLORIDE BLD-SCNC: 101 MMOL/L (ref 98–107)
CO2 POC: 23 MMOL/L (ref 22–32)
CREAT BLD-MCNC: 0.8 MG/DL (ref 0.8–1.5)
EGFR (POC): 94.4
GLUCOSE POC: 166 MG/DL (ref 75–110)
GRAN# POC: 6.6 K/UL (ref 2–7.8)
GRAN% POC: 75.4 % (ref 37–92)
HCT VFR BLD CALC: 34.3 % (ref 37–51)
HGB BLD-MCNC: 10.3 G/DL (ref 12–18)
LY# POC: 1.7 K/UL (ref 0.6–4.1)
LY% POC: 20.5 % (ref 10–58.5)
MCH RBC QN: 20.8 PG (ref 26–32)
MCHC RBC-ENTMCNC: 30 G/DL (ref 30–36)
MCV RBC: 69 FL (ref 80–97)
MID #, POC: 0.3 K/UL (ref 0–1.8)
MID% POC: 4.1 % (ref 0.1–24)
PLATELET # BLD: 281 K/UL (ref 140–440)
POTASSIUM SERPL-SCNC: 5.1 MMOL/L (ref 3.6–5)
PROT SERPL-MCNC: 7.2 G/DL (ref 6.3–8.2)
RBC # BLD: 4.94 M/UL (ref 4.2–6.3)
SODIUM SERPL-SCNC: 140 MMOL/L (ref 137–145)
TOTAL BILIRUBIN POC: 0.6 MG/DL (ref 0.2–1.3)
WBC # BLD: 8.6 K/UL (ref 4.1–10.9)

## 2017-08-10 NOTE — MR AVS SNAPSHOT
Visit Information Date & Time Provider Department Dept. Phone Encounter #  
 8/10/2017  2:10 PM Verner Auerbach, MD [facility] [id number] Upcoming Health Maintenance Date Due Hepatitis C Screening 1955 FOOT EXAM Q1 7/17/1963 MICROALBUMIN Q1 7/17/1963 EYE EXAM RETINAL OR DILATED Q1 7/17/1963 DTaP/Tdap/Td series (1 - Tdap) 7/17/1974 LIPID PANEL Q1 10/26/2011 ZOSTER VACCINE AGE 60> 5/17/2013 HEMOGLOBIN A1C Q6M 4/2/2016 INFLUENZA AGE 9 TO ADULT 8/1/2017 FOBT Q 1 YEAR AGE 50-75 7/26/2018 Allergies as of 8/10/2017  Review Complete On: 8/10/2017 By: Verner Auerbach, MD  
  
 Severity Noted Reaction Type Reactions Demerol [Meperidine]  10/25/2010    Hives Pcn [Penicillins]  10/25/2010    Hives Current Immunizations  Reviewed on 12/9/2015 Name Date Influenza Vaccine 10/1/2014 Influenza Vaccine (Quad) PF 10/5/2015  2:22 PM  
 Influenza Vaccine Whole 10/1/2010 Pneumococcal Polysaccharide (PPSV-23) 10/22/2014 10:00 AM  
  
 Not reviewed this visit You Were Diagnosed With   
  
 Codes Comments Gastrointestinal hemorrhage with melena    -  Primary ICD-10-CM: K92.1 ICD-9-CM: 578.1 Chronic blood loss anemia     ICD-10-CM: D50.0 ICD-9-CM: 280.0 Paroxysmal atrial fibrillation (HCC)     ICD-10-CM: I48.0 ICD-9-CM: 427.31 Anticoagulant long-term use     ICD-10-CM: Z79.01 
ICD-9-CM: V58.61 Vitals BP Pulse Height(growth percentile) Weight(growth percentile) BMI Smoking Status 118/62 (BP 1 Location: Left arm, BP Patient Position: Sitting) 68 6' (1.829 m) 205 lb (93 kg) 27.8 kg/m2 Never Smoker BMI and BSA Data Body Mass Index Body Surface Area  
 27.8 kg/m 2 2.17 m 2 Preferred Pharmacy Pharmacy Name Phone [phone] [phone] Your Updated Medication List  
  
 This list is accurate as of: 8/10/17  2:50 PM.  Always use your most recent med list.  
  
  
  
  
 CARDIZEM  mg ER capsule Generic drug:  dilTIAZem CD Take 120 mg by mouth every morning. Indications: HYPERTENSION  
  
 lisinopril 20 mg tablet Commonly known as:  Erasto Handing Take 1 Tab by mouth daily. metFORMIN 1,000 mg tablet Commonly known as:  GLUCOPHAGE Take 1,000 mg by mouth two (2) times daily (with meals). pantoprazole 40 mg tablet Commonly known as:  PROTONIX Take 1 Tab by mouth Before breakfast and dinner. sucralfate 100 mg/mL suspension Commonly known as:  Everton Sleek Take 10 mL by mouth Before breakfast, lunch, dinner and at bedtime. We Performed the Following AMB POC COMPLETE CBC,AUTOMATED ENTER [id number] CPT(R)] AMB POC COMPREHENSIVE METABOLIC PANEL [70991 CPT(R)] Patient Instructions Iron Deficiency Anemia: Care Instructions Your Care Instructions Anemia means that you do not have enough red blood cells. Red blood cells carry oxygen around your body. When you have anemia, it can make you pale, weak, and tired. Many things can cause anemia. The most common cause is loss of blood. This can happen if you have heavy menstrual periods. It can also happen if you have bleeding in your stomach or bowel. You can also get anemia if you don't have enough iron in your diet or if it's hard for your body to absorb iron. In some cases, pregnancy causes anemia. That's because a pregnant woman needs more iron. Your doctor may do more tests to find the cause of your anemia. If a disease or other health problem is causing it, your doctor will treat that problem. It's important to follow up with your doctor to make sure that your iron level returns to normal. 
Follow-up care is a key part of your treatment and safety.  Be sure to make and go to all appointments, and call your doctor if you are having problems. It's also a good idea to know your test results and keep a list of the medicines you take. How can you care for yourself at home? · If your doctor recommended iron pills, take them as directed. ¨ Try to take the pills on an empty stomach. You can do this about 1 hour before or 2 hours after meals. But you may need to take iron with food to avoid an upset stomach. ¨ Do not take antacids or drink milk or anything with caffeine within 2 hours of when you take your iron. They can keep your body from absorbing the iron well. ¨ Vitamin C helps your body absorb iron. You may want to take iron pills with a glass of orange juice or some other food high in vitamin C. 
¨ Iron pills may cause stomach problems. These include heartburn, nausea, diarrhea, constipation, and cramps. It can help to drink plenty of fluids and include fruits, vegetables, and fiber in your diet. ¨ It's normal for iron pills to make your stool a greenish or grayish black. But internal bleeding can also cause dark stool. So it's important to tell your doctor about any color changes. ¨ Call your doctor if you think you are having a problem with your iron pills. Even after you start to feel better, it will take several months for your body to build up its supply of iron. ¨ If you miss a pill, don't take a double dose. ¨ Keep iron pills out of the reach of small children. Too much iron can be very dangerous. · Eat foods with a lot of iron. These include red meat, shellfish, poultry, and eggs. They also include beans, raisins, whole-grain bread, and leafy green vegetables. · Steam your vegetables. This is the best way to prepare them if you want to get as much iron as possible. · Be safe with medicines. Do not take nonsteroidal anti-inflammatory pain relievers unless your doctor tells you to. These include aspirin, naproxen (Aleve), and ibuprofen (Advil, Motrin). · Liquid iron can stain your teeth.  But you can mix it with water or juice and drink it with a straw. Then it won't get on your teeth. When should you call for help? Call 911 anytime you think you may need emergency care. For example, call if: 
· You passed out (lost consciousness). · You vomit blood or what looks like coffee grounds. · You pass maroon or very bloody stools. Call your doctor now or seek immediate medical care if: 
· Your stools are black and look like tar, or they have streaks of blood. · You are dizzy or lightheaded, or you feel like you may faint. Watch closely for changes in your health, and be sure to contact your doctor if: 
· Your fatigue and weakness continue or get worse. · You have side effects from taking iron pills, such as nausea, vomiting, constipation, diarrhea, or heartburn. · You do not get better as expected. Where can you learn more? Go to http://isra-hadley.info/. Enter A036 in the search box to learn more about \"Iron Deficiency Anemia: Care Instructions. \" Current as of: October 13, 2016 Content Version: 11.3 © 5118-9787 [employer]. Care instructions adapted under license by [employer] (which disclaims liability or warranty for this information). If you have questions about a medical condition or this instruction, always ask your healthcare professional. [place] [address] any warranty or liability for your use of this information. Introducing [facility]! [employer] introduces NextSpace patient portal. Now you can access parts of your medical record, email your doctor's office, and request medication refills online. 1. In your internet browser, go to [email or web address] 2. Click on the First Time User? Click Here link in the Sign In box. You will see the New Member Sign Up page. 3. Enter your NextSpace Access Code exactly as it appears below. You will not need to use this code after youve completed the sign-up process.  If you do not sign up before the expiration date, you must request a new code. · VC[employer] Access Code: [email or web address] Expires: 10/24/2017  2:00 PM 
 
4. Enter the last four digits of your Social Security Number (xxxx) and Date of Birth (mm/dd/yyyy) as indicated and click Submit. You will be taken to the next sign-up page. 5. Create a [employer] ID. This will be your [employer] login ID and cannot be changed, so think of one that is secure and easy to remember. 6. Create a VC[employer] password. You can change your password at any time. 7. Enter your Password Reset Question and Answer. This can be used at a later time if you forget your password. 8. Enter your e-mail address. You will receive e-mail notification when new information is available in [address] 9. Click Sign Up. You can now view and download portions of your medical record. 10. Click the Download Summary menu link to download a portable copy of your medical information. If you have questions, please visit the Frequently Asked Questions section of the [employer] website. Remember, [employer] is NOT to be used for urgent needs. For medical emergencies, dial 911. Now available from your iPhone and Android! Please provide this summary of care documentation to your next provider. Your primary care clinician is listed as LAUREN Cam. If you have any questions after today's visit, please call [phone].

## 2017-08-10 NOTE — PATIENT INSTRUCTIONS
Iron Deficiency Anemia: Care Instructions  Your Care Instructions    Anemia means that you do not have enough red blood cells. Red blood cells carry oxygen around your body. When you have anemia, it can make you pale, weak, and tired. Many things can cause anemia. The most common cause is loss of blood. This can happen if you have heavy menstrual periods. It can also happen if you have bleeding in your stomach or bowel. You can also get anemia if you don't have enough iron in your diet or if it's hard for your body to absorb iron. In some cases, pregnancy causes anemia. That's because a pregnant woman needs more iron. Your doctor may do more tests to find the cause of your anemia. If a disease or other health problem is causing it, your doctor will treat that problem. It's important to follow up with your doctor to make sure that your iron level returns to normal.  Follow-up care is a key part of your treatment and safety. Be sure to make and go to all appointments, and call your doctor if you are having problems. It's also a good idea to know your test results and keep a list of the medicines you take. How can you care for yourself at home? · If your doctor recommended iron pills, take them as directed. ¨ Try to take the pills on an empty stomach. You can do this about 1 hour before or 2 hours after meals. But you may need to take iron with food to avoid an upset stomach. ¨ Do not take antacids or drink milk or anything with caffeine within 2 hours of when you take your iron. They can keep your body from absorbing the iron well. ¨ Vitamin C helps your body absorb iron. You may want to take iron pills with a glass of orange juice or some other food high in vitamin C.  ¨ Iron pills may cause stomach problems. These include heartburn, nausea, diarrhea, constipation, and cramps. It can help to drink plenty of fluids and include fruits, vegetables, and fiber in your diet.   ¨ It's normal for iron pills to make your stool a greenish or grayish black. But internal bleeding can also cause dark stool. So it's important to tell your doctor about any color changes. ¨ Call your doctor if you think you are having a problem with your iron pills. Even after you start to feel better, it will take several months for your body to build up its supply of iron. ¨ If you miss a pill, don't take a double dose. ¨ Keep iron pills out of the reach of small children. Too much iron can be very dangerous. · Eat foods with a lot of iron. These include red meat, shellfish, poultry, and eggs. They also include beans, raisins, whole-grain bread, and leafy green vegetables. · Steam your vegetables. This is the best way to prepare them if you want to get as much iron as possible. · Be safe with medicines. Do not take nonsteroidal anti-inflammatory pain relievers unless your doctor tells you to. These include aspirin, naproxen (Aleve), and ibuprofen (Advil, Motrin). · Liquid iron can stain your teeth. But you can mix it with water or juice and drink it with a straw. Then it won't get on your teeth. When should you call for help? Call 911 anytime you think you may need emergency care. For example, call if:  · You passed out (lost consciousness). · You vomit blood or what looks like coffee grounds. · You pass maroon or very bloody stools. Call your doctor now or seek immediate medical care if:  · Your stools are black and look like tar, or they have streaks of blood. · You are dizzy or lightheaded, or you feel like you may faint. Watch closely for changes in your health, and be sure to contact your doctor if:  · Your fatigue and weakness continue or get worse. · You have side effects from taking iron pills, such as nausea, vomiting, constipation, diarrhea, or heartburn. · You do not get better as expected. Where can you learn more? Go to http://farhad.info/.   Enter Q727 in the search box to learn more about \"Iron Deficiency Anemia: Care Instructions. \"  Current as of: October 13, 2016  Content Version: 11.3  © 7147-1553 [employer]. Care instructions adapted under license by [employer] (which disclaims liability or warranty for this information). If you have questions about a medical condition or this instruction, always ask your healthcare professional. [address] any warranty or liability for your use of this information.

## 2017-08-10 NOTE — PROGRESS NOTES
Mari Candelario is a 57 y.o. male presenting for Hospital Follow Up (anemia)  . 1. Have you been to the ER, urgent care clinic since your last visit? Hospitalized since your last visit? Yes When: 7/2017 Where: [facility] Reason for visit: anemia    2. Have you seen or consulted any other health care providers outside of the [address] [place] [address] since your last visit? Include any pap smears or colon screening. Yes When: 8/4/2017 Where: Dr. Osbaldo Mendoza Reason for visit: colonoscopy and egd    No flowsheet data found. Abuse Screening Questionnaire 8/10/2017   Do you ever feel afraid of your partner? N   Are you in a relationship with someone who physically or mentally threatens you? N   Is it safe for you to go home? Y       PHQ over the last two weeks 8/10/2017   Little interest or pleasure in doing things Not at all   Feeling down, depressed or hopeless Not at all   Total Score PHQ 2 0       There are no discontinued medications.
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
## 2017-08-21 RX ORDER — PANTOPRAZOLE SODIUM 40 MG/1
TABLET, DELAYED RELEASE ORAL
Qty: 60 TAB | Refills: 0 | Status: SHIPPED | OUTPATIENT
Start: 2017-08-21 | End: 2017-10-04 | Stop reason: SDUPTHER

## 2017-08-21 NOTE — TELEPHONE ENCOUNTER
Requested Prescriptions     Pending Prescriptions Disp Refills    pantoprazole (PROTONIX) 40 mg tablet [Pharmacy Med Name: PANTOPRAZOLE SOD DR 40 MG TAB] 60 Tab 0     Sig: TAKE 1 TAB BY MOUTH BEFORE BREAKFAST AND DINNER.        Last Refill: 7-28-17  Last visit:8/10/2017

## 2017-10-04 RX ORDER — PANTOPRAZOLE SODIUM 40 MG/1
TABLET, DELAYED RELEASE ORAL
Qty: 60 TAB | Refills: 0 | Status: SHIPPED | OUTPATIENT
Start: 2017-10-04 | End: 2017-11-17 | Stop reason: ALTCHOICE

## 2017-10-04 NOTE — TELEPHONE ENCOUNTER
Requested Prescriptions     Pending Prescriptions Disp Refills    pantoprazole (PROTONIX) 40 mg tablet [Pharmacy Med Name: PANTOPRAZOLE SOD DR 40 MG TAB] 60 Tab 0     Sig: TAKE 1 TAB BY MOUTH BEFORE BREAKFAST AND DINNER.        Last Refill: 8-21-17  Last visit:8/10/2017

## 2017-10-16 RX ORDER — ESOMEPRAZOLE MAGNESIUM 40 MG/1
CAPSULE, DELAYED RELEASE ORAL DAILY
COMMUNITY
End: 2017-11-17 | Stop reason: ALTCHOICE

## 2017-11-14 ENCOUNTER — ANESTHESIA (OUTPATIENT)
Dept: ENDOSCOPY | Age: 62
End: 2017-11-14
Payer: COMMERCIAL

## 2017-11-14 ENCOUNTER — HOSPITAL ENCOUNTER (OUTPATIENT)
Age: 62
Setting detail: OUTPATIENT SURGERY
Discharge: HOME | End: 2017-11-14
Attending: SPECIALIST | Admitting: SPECIALIST
Payer: COMMERCIAL

## 2017-11-14 ENCOUNTER — APPOINTMENT (OUTPATIENT)
Dept: GENERAL RADIOLOGY | Age: 62
End: 2017-11-14
Attending: SPECIALIST
Payer: COMMERCIAL

## 2017-11-14 ENCOUNTER — ANESTHESIA EVENT (OUTPATIENT)
Dept: ENDOSCOPY | Age: 62
End: 2017-11-14
Payer: COMMERCIAL

## 2017-11-14 VITALS
TEMPERATURE: 98.1 F | HEIGHT: 72 IN | OXYGEN SATURATION: 95 % | BODY MASS INDEX: 28.1 KG/M2 | SYSTOLIC BLOOD PRESSURE: 142 MMHG | RESPIRATION RATE: 16 BRPM | HEART RATE: 75 BPM | WEIGHT: 207.44 LBS | DIASTOLIC BLOOD PRESSURE: 77 MMHG

## 2017-11-14 LAB
GLUCOSE BLD STRIP.AUTO-MCNC: 221 MG/DL (ref 65–100)
SERVICE CMNT-IMP: ABNORMAL

## 2017-11-14 PROCEDURE — 82962 GLUCOSE BLOOD TEST: CPT

## 2017-11-14 PROCEDURE — 77030008684 HC TU ET CUF COVD -B: Performed by: ANESTHESIOLOGY

## 2017-11-14 PROCEDURE — 76040000007: Performed by: SPECIALIST

## 2017-11-14 PROCEDURE — 77030026438 HC STYL ET INTUB CARD -A: Performed by: ANESTHESIOLOGY

## 2017-11-14 PROCEDURE — 74011000250 HC RX REV CODE- 250

## 2017-11-14 PROCEDURE — 77030036911 HC NDL INJ ENTERSCP FORCE MAX OCOA -B: Performed by: SPECIALIST

## 2017-11-14 PROCEDURE — 74011250636 HC RX REV CODE- 250/636

## 2017-11-14 PROCEDURE — 77030022711 HC TU ENTRPRO BLN DISP OLSI -C: Performed by: SPECIALIST

## 2017-11-14 PROCEDURE — 76060000032 HC ANESTHESIA 0.5 TO 1 HR: Performed by: SPECIALIST

## 2017-11-14 RX ORDER — SUCCINYLCHOLINE CHLORIDE 20 MG/ML
INJECTION INTRAMUSCULAR; INTRAVENOUS AS NEEDED
Status: DISCONTINUED | OUTPATIENT
Start: 2017-11-14 | End: 2017-11-14 | Stop reason: HOSPADM

## 2017-11-14 RX ORDER — SODIUM CHLORIDE 9 MG/ML
INJECTION, SOLUTION INTRAVENOUS
Status: DISCONTINUED | OUTPATIENT
Start: 2017-11-14 | End: 2017-11-14 | Stop reason: HOSPADM

## 2017-11-14 RX ORDER — PROPOFOL 10 MG/ML
INJECTION, EMULSION INTRAVENOUS AS NEEDED
Status: DISCONTINUED | OUTPATIENT
Start: 2017-11-14 | End: 2017-11-14 | Stop reason: HOSPADM

## 2017-11-14 RX ORDER — ROCURONIUM BROMIDE 10 MG/ML
INJECTION, SOLUTION INTRAVENOUS AS NEEDED
Status: DISCONTINUED | OUTPATIENT
Start: 2017-11-14 | End: 2017-11-14 | Stop reason: HOSPADM

## 2017-11-14 RX ORDER — DILTIAZEM HYDROCHLORIDE 120 MG/1
120 CAPSULE, EXTENDED RELEASE ORAL
COMMUNITY
End: 2018-03-22 | Stop reason: SDUPTHER

## 2017-11-14 RX ORDER — ONDANSETRON 2 MG/ML
INJECTION INTRAMUSCULAR; INTRAVENOUS AS NEEDED
Status: DISCONTINUED | OUTPATIENT
Start: 2017-11-14 | End: 2017-11-14 | Stop reason: HOSPADM

## 2017-11-14 RX ORDER — LIDOCAINE HYDROCHLORIDE 20 MG/ML
INJECTION, SOLUTION EPIDURAL; INFILTRATION; INTRACAUDAL; PERINEURAL AS NEEDED
Status: DISCONTINUED | OUTPATIENT
Start: 2017-11-14 | End: 2017-11-14 | Stop reason: HOSPADM

## 2017-11-14 RX ADMIN — PROPOFOL 50 MG: 10 INJECTION, EMULSION INTRAVENOUS at 13:12

## 2017-11-14 RX ADMIN — PROPOFOL 150 MG: 10 INJECTION, EMULSION INTRAVENOUS at 12:56

## 2017-11-14 RX ADMIN — SODIUM CHLORIDE: 9 INJECTION, SOLUTION INTRAVENOUS at 12:30

## 2017-11-14 RX ADMIN — ROCURONIUM BROMIDE 10 MG: 10 INJECTION, SOLUTION INTRAVENOUS at 12:56

## 2017-11-14 RX ADMIN — ONDANSETRON 4 MG: 2 INJECTION INTRAMUSCULAR; INTRAVENOUS at 13:07

## 2017-11-14 RX ADMIN — SUCCINYLCHOLINE CHLORIDE 160 MG: 20 INJECTION INTRAMUSCULAR; INTRAVENOUS at 12:56

## 2017-11-14 RX ADMIN — PROPOFOL 50 MG: 10 INJECTION, EMULSION INTRAVENOUS at 13:05

## 2017-11-14 RX ADMIN — LIDOCAINE HYDROCHLORIDE 100 MG: 20 INJECTION, SOLUTION EPIDURAL; INFILTRATION; INTRACAUDAL; PERINEURAL at 12:56

## 2017-11-14 NOTE — IP AVS SNAPSHOT
[address] [facility] [address] [place] [address] 
[phone] Patient: Erasmo Alejandra MRN: [id number] FDM:[id number] About your hospitalization You were admitted on:  November 14, 2017 You last received care in the:  [facility] ENDOSCOPY You were discharged on:  November 14, 2017 Why you were hospitalized Your primary diagnosis was:  Not on File Things You Need To Do (next 8 weeks) Friday Nov 17, 2017 FOLLOW UP 10 with Sundar Betancur MD at 11:00 AM  
Where:  [facility] ([facility]) Discharge Orders None A check rodri indicates which time of day the medication should be taken. My Medications TAKE these medications as instructed Instructions Each Dose to Equal  
 Morning Noon Evening Bedtime  
 dilTIAZem  mg XR capsule Commonly known as:  DILACOR XR Your last dose was: Your next dose is: Take 120 mg by mouth daily. 120 mg  
    
   
   
   
  
 lisinopril 20 mg tablet Commonly known as:  Maggy Reasons Your last dose was: Your next dose is: Take 1 Tab by mouth daily. 20 mg  
    
   
   
   
  
 metFORMIN 1,000 mg tablet Commonly known as:  GLUCOPHAGE Your last dose was: Your next dose is: Take 1,000 mg by mouth two (2) times daily (with meals). 1000 mg NexIUM 40 mg capsule Generic drug:  esomeprazole Your last dose was: Your next dose is: Take  by mouth daily. Francisco Sharonda Your last dose was: Your next dose is:    
   
   
 by Does Not Apply route. pantoprazole 40 mg tablet Commonly known as:  PROTONIX Your last dose was: Your next dose is: TAKE 1 TAB BY MOUTH BEFORE BREAKFAST AND DINNER. Discharge Instructions Trevor Bailon [id number] 
1955 SINGLE BALLOON ANTEGRADE ENTEROSCOPY DISCHARGE INSTRUCTIONS Discomfort: 
Sore throat- throat lozenges or warm salt water gargle 
redness at IV site- apply warm compress to area; if redness or soreness persist- contact your physician Gaseous discomfort- walking, belching will help relieve any discomfort You may not operate a vehicle for 12 hours You may not engage in an occupation involving machinery or appliances for rest of today. You may not drink alcoholic beverages for at least 12 hours Avoid making any critical decisions for at least 24 hour DIET You may resume your regular diet  however -  remember your colon is empty and a heavy meal will produce gas. Avoid these foods:  vegetables, fried / greasy foods, carbonated drinks ACTIVITY You may resume your normal daily activities. Spend the remainder of the day resting -  avoid any strenuous activity. CALL M.D. ANY SIGN OF Increasing pain, nausea, vomiting Abdominal distension (swelling) New increased bleeding (oral or rectal) Fever (chills) Pain in chest area Bloody discharge from nose or mouth Shortness of breath You may take any Advil, Aspirin, Ibuprofen, Motrin, Aleve, or Goodys if MD recommended, or  Tylenol as needed for pain. Follow-up Instructions: 
 Call Dr. Mary Mahmood office for follow up Office telephone for problems or questions [phone] Introducing [facility]! [facility] introduces [employer] patient portal. Now you can access parts of your medical record, email your doctor's office, and request medication refills online. 1. In your internet browser, go to [email or web address] 2. Click on the First Time User? Click Here link in the Sign In box. You will see the New Member Sign Up page. 3. Enter your [employer] Access Code exactly as it appears below.  You will not need to use this code after youve completed the sign-up process. If you do not sign up before the expiration date, you must request a new code. · [employer] Access Code: [email or web address] Expires: 2/12/2018  2:43 PM 
 
4. Enter the last four digits of your Social Security Number (xxxx) and Date of Birth (mm/dd/yyyy) as indicated and click Submit. You will be taken to the next sign-up page. 5. Create a [employer] ID. This will be your [employer] login ID and cannot be changed, so think of one that is secure and easy to remember. 6. Create a [employer] password. You can change your password at any time. 7. Enter your Password Reset Question and Answer. This can be used at a later time if you forget your password. 8. Enter your e-mail address. You will receive e-mail notification when new information is available in [address] 9. Click Sign Up. You can now view and download portions of your medical record. 10. Click the Download Summary menu link to download a portable copy of your medical information. If you have questions, please visit the Frequently Asked Questions section of the [employer] website. Remember, [employer] is NOT to be used for urgent needs. For medical emergencies, dial 911. Now available from your iPhone and Android! Unresulted Labs-Please follow up with your PCP about these lab tests Order Current Status XR ENDO ENDOSCOPY SMALL BOWEL In process Providers Seen During Your Hospitalization Provider Specialty Primary office phone Bethany Issa MD Gastroenterology [phone] Your Primary Care Physician (PCP) Primary Care Physician Office Phone Office Fax Juan Wilder [phone] You are allergic to the following Allergen Reactions Demerol (Meperidine) Hives Pcn (Penicillins) Hives Recent Documentation Height Weight BMI Smoking Status 1.829 m 94.1 kg 28.13 kg/m2 Never Smoker Emergency Contacts Name Discharge Info Relation Home Work Mobile [address] CAREGIVER [3] Spouse [3] [phone] Patient Belongings The following personal items are in your possession at time of discharge: 
  Dental Appliances: None  Visual Aid: Glasses Please provide this summary of care documentation to your next provider. Signatures-by signing, you are acknowledging that this After Visit Summary has been reviewed with you and you have received a copy. Patient Signature:  ____________________________________________________________ Date:  ____________________________________________________________  
  
[facility] Gene Provider Signature:  ____________________________________________________________ Date:  ____________________________________________________________

## 2017-11-14 NOTE — DISCHARGE INSTRUCTIONS
Varun Nelson  [id number]  1955    SINGLE BALLOON ANTEGRADE ENTEROSCOPY DISCHARGE INSTRUCTIONS  Discomfort:  Sore throat- throat lozenges or warm salt water gargle  redness at IV site- apply warm compress to area; if redness or soreness persist- contact your physician  Gaseous discomfort- walking, belching will help relieve any discomfort  You may not operate a vehicle for 12 hours  You may not engage in an occupation involving machinery or appliances for rest of today. You may not drink alcoholic beverages for at least 12 hours  Avoid making any critical decisions for at least 24 hour  DIET  You may resume your regular diet - however -  remember your colon is empty and a heavy meal will produce gas. Avoid these foods:  vegetables, fried / greasy foods, carbonated drinks  ACTIVITY  You may resume your normal daily activities. Spend the remainder of the day resting -  avoid any strenuous activity. CALL M.D. ANY SIGN OF   Increasing pain, nausea, vomiting  Abdominal distension (swelling)  New increased bleeding (oral or rectal)  Fever (chills)  Pain in chest area  Bloody discharge from nose or mouth  Shortness of breath    You may take any Advil, Aspirin, Ibuprofen, Motrin, Aleve, or Goodys if MD recommended, or  Tylenol as needed for pain.     Follow-up Instructions:   Call Dr. Pool Rater office for follow up  Office telephone for problems or questions [phone]

## 2017-11-14 NOTE — PROCEDURES
[address]  [address]       SINGLE BALLOON ENTEROSCOPY PROCEDURE NOTE      Krishan Abraham  1955  [id number]      Procedure: Single Balloon Enteroscopy and submucosal injection    Indication:  Gastrointestinal hemorrhage, unspecified, with M2A demonstrating benign polyp of the mid to distal  jejunum and AVM of proximal jejunum -neither bleeding    : Dr. Carly Morrow MD    Referring Provider: Roderick Gan MD      Anesthesia/Sedation: General anesthesia . Procedure Details    After infomed consent was obtained for the procedure, with all risks and  benefits of procedure explained the patient was taken to the endoscopy  suite and placed in the left lateral decubitus position. Following  sequential administration of sedation as per above, a standard EGD was performed. Findings are described below. Next, the enteroscope was  inserted into the mouth and advanced under direct vision to mid-jejunum  A careful inspection was completed, findings and interventions  are described below. Findings:    Esophagus:normal     Stomach: normal      Duodenum: normal    Jejunum: mid-jejunum with [address] tattoo x 2 to demonstrate furthest advance     ileum: not intubated      EBL: none      Therapies: See above      Specimens: See above    Complications: None; patient tolerated the procedure well. Impression:   1. Source of bleeding not found, furthest advance marked with [address] tattoo    Recommendations:  1. Now off Eliquis so may have no further bleeding. Feel polyp is benign and consider follow up M2A in 1 year. AVM seen on M2A study not seen today.       aCrly Morrow MD    11/14/2017 1:53 PM

## 2017-11-14 NOTE — H&P
Pre-endoscopy H and P    The patient was seen and examined. The airway was assessed and documented. The problem list, past medical history, and medications were reviewed. Patient Active Problem List   Diagnosis Code    Spinal stenosis, lumbar region, with neurogenic claudication M48.062    Sciatica M54.30    Adrenal mass, left (HCC) E27.9    Constipation K59.00    Midline low back pain without sciatica M54.5    Osteomyelitis of lumbar spine ([facility]) M46.26    Osteomyelitis ([facility]) M86.9    Symptomatic cholelithiasis K80.20    Cholelithiasis K80.20    Post-operative infection T81. 4XXA    Back pain M54.9    Discitis of lumbar region M46.46    Iron deficiency anemia D50.9    Chronic blood loss anemia D50.0    Anticoagulant long-term use Z79.01    Heme positive stool R19.5    Gastrointestinal hemorrhage with melena K92.1    Paroxysmal atrial fibrillation ([facility]) I48.0    Uncontrolled diabetes mellitus type 2 without complications ([facility]) O16.39    Hypomagnesemia E83.42     Social History     Social History    Marital status:      Spouse name: N/A    Number of children: N/A    Years of education: N/A     Occupational History    Not on file.      Social History Main Topics    Smoking status: Never Smoker    Smokeless tobacco: Never Used    Alcohol use Yes      Comment: Occasional    Drug use: No    Sexual activity: Yes     Partners: Female     Other Topics Concern    Not on file     Social History Narrative     Past Medical History:   Diagnosis Date    Anemia     Arthritis     fingers    Atrial fibrillation (HCC)     Atrial flutter (HCC)     Bowel trouble     Chronic pain     Diabetes ([facility] [address]     GERD (gastroesophageal reflux disease)     Hypercholesteremia     Hypertension     SVT (supraventricular tachycardia) ([facility])     Dr Ranjit Gordillo    Unspecified sleep apnea      The patient has a family history of na    Prior to Admission Medications   Prescriptions Last Dose Informant Patient Reported? Taking? BLOOD-GLUCOSE METER (Osmel Brunson) Not Taking at Unknown time  Yes No   Sig: by Does Not Apply route. dilTIAZem XR (DILACOR XR) 120 mg XR capsule 11/13/2017 at Unknown time  Yes Yes   Sig: Take 120 mg by mouth daily. esomeprazole (NEXIUM) 40 mg capsule 11/13/2017 at Unknown time  Yes Yes   Sig: Take  by mouth daily. lisinopril (PRINIVIL, ZESTRIL) 20 mg tablet 11/13/2017 at Unknown time  No Yes   Sig: Take 1 Tab by mouth daily. metFORMIN (GLUCOPHAGE) 1,000 mg tablet 11/14/2017 at Unknown time  Yes Yes   Sig: Take 1,000 mg by mouth two (2) times daily (with meals). pantoprazole (PROTONIX) 40 mg tablet 11/13/2017 at Unknown time  No Yes   Sig: TAKE 1 TAB BY MOUTH BEFORE BREAKFAST AND DINNER. Facility-Administered Medications: None         The review of systems is:  negative for shortness of breath or chest pain      The heart, lungs and mental status were satisfactory for the administration of MAC sedation and for the procedure. Mallampati score: See Anesthesia. I discussed with the patient the objectives, risks, consequences and alternatives to the procedure. Plan: Endoscopic procedure with MAC sedation.     Gianni Borja MD  11/14/2017  12:55 PM

## 2017-11-14 NOTE — ANESTHESIA POSTPROCEDURE EVALUATION
Post-Anesthesia Evaluation and Assessment    Patient: Marcin Wolff MRN: [id number]  SSN: [id number]    YOB: 1955  Age: 57 y.o. Sex: male       Cardiovascular Function/Vital Signs  Visit Vitals    /75    Pulse 76    Temp 36.7 °C (98.1 °F)    Resp 17    Ht 6' (1.829 m)    Wt 94.1 kg (207 lb 7 oz)    SpO2 94%    BMI 28.13 kg/m2       Patient is status post general anesthesia for Procedure(s):  ANTERGRADE ENTEROSCOPY  INJECTION. Nausea/Vomiting: None    Postoperative hydration reviewed and adequate. Pain:  Pain Scale 1: Numeric (0 - 10) (11/14/17 1413)  Pain Intensity 1: 0 (11/14/17 1413)   Managed    Neurological Status: At baseline    Mental Status and Level of Consciousness: Arousable    Pulmonary Status:   O2 Device: Room air (11/14/17 1413)   Adequate oxygenation and airway patent    Complications related to anesthesia: None    Post-anesthesia assessment completed.  No concerns    Signed By: Isamar Lackey MD     November 14, 2017

## 2017-11-14 NOTE — PERIOP NOTES

## 2017-11-14 NOTE — ANESTHESIA PREPROCEDURE EVALUATION
Anesthetic History   No history of anesthetic complications            Review of Systems / Medical History  Patient summary reviewed, nursing notes reviewed and pertinent labs reviewed    Pulmonary        Sleep apnea           Neuro/Psych   Within defined limits           Cardiovascular    Hypertension        Dysrhythmias            GI/Hepatic/Renal     GERD           Endo/Other    Diabetes    Arthritis     Other Findings              Physical Exam    Airway  Mallampati: II  TM Distance: > 6 cm  Neck ROM: normal range of motion   Mouth opening: Normal     Cardiovascular  Regular rate and rhythm,  S1 and S2 normal,  no murmur, click, rub, or gallop             Dental  No notable dental hx       Pulmonary  Breath sounds clear to auscultation               Abdominal  GI exam deferred       Other Findings            Anesthetic Plan    ASA: 2  Anesthesia type: MAC          Induction: Intravenous  Anesthetic plan and risks discussed with: Patient

## 2017-11-14 NOTE — ROUTINE PROCESS
[facility]  1955  [id number]    Situation:  Verbal report received from: Onofre Escobar RN  Procedure: Procedure(s):  ANTERGRADE ENTEROSCOPY  INJECTION    Background:    Preoperative diagnosis: POLYP OF STOMACH AND DUODENUM, ARTERIOVENOUS MALFORMATION, GASTROINTESTINAL HEMORRHAGE  Postoperative diagnosis: GI Bleeding    :  Dr. Lefty Wilson  Assistant(s): Endoscopy Technician-1: Eileen Hollins  Endoscopy RN-1: Marshall Melgar RN    Specimens: * No specimens in log *  H. Pylori  no    Assessment:  Intra-procedure medications   Anesthesia gave intra-procedure sedation and medications, see anesthesia flow sheet yes    Intravenous fluids: NS@ KVO     Vital signs stable     Abdominal assessment: round and soft     Recommendation:  Discharge patient per MD order.     Family or Friend   Permission to share finding with family or friend yes

## 2017-11-17 ENCOUNTER — OFFICE VISIT (OUTPATIENT)
Dept: INTERNAL MEDICINE CLINIC | Age: 62
End: 2017-11-17

## 2017-11-17 VITALS
SYSTOLIC BLOOD PRESSURE: 150 MMHG | TEMPERATURE: 97 F | WEIGHT: 209.2 LBS | BODY MASS INDEX: 28.33 KG/M2 | HEIGHT: 72 IN | RESPIRATION RATE: 16 BRPM | DIASTOLIC BLOOD PRESSURE: 92 MMHG | HEART RATE: 79 BPM | OXYGEN SATURATION: 99 %

## 2017-11-17 DIAGNOSIS — K92.1 GASTROINTESTINAL HEMORRHAGE WITH MELENA: ICD-10-CM

## 2017-11-17 DIAGNOSIS — I10 ESSENTIAL HYPERTENSION: ICD-10-CM

## 2017-11-17 DIAGNOSIS — Z23 ENCOUNTER FOR IMMUNIZATION: ICD-10-CM

## 2017-11-17 DIAGNOSIS — I48.0 PAROXYSMAL ATRIAL FIBRILLATION (HCC): ICD-10-CM

## 2017-11-17 LAB
ALBUMIN SERPL-MCNC: 4.6 G/DL (ref 3.9–5.4)
ALKALINE PHOS POC: 102 U/L (ref 38–126)
ALT SERPL-CCNC: 29 U/L (ref 9–52)
AST SERPL-CCNC: 26 U/L (ref 14–36)
BACTERIA UA POCT, BACTPOCT: NORMAL
BILIRUB UR QL STRIP: NEGATIVE
BUN BLD-MCNC: 14 MG/DL (ref 9–20)
CALCIUM BLD-MCNC: 10.1 MG/DL (ref 8.4–10.2)
CASTS UA POCT: 0
CHLORIDE BLD-SCNC: 101 MMOL/L (ref 98–107)
CHOLEST SERPL-MCNC: 134 MG/DL (ref 0–200)
CLUE CELLS, CLUEPOCT: NEGATIVE
CO2 POC: 27 MMOL/L (ref 22–32)
CREAT BLD-MCNC: 0.8 MG/DL (ref 0.8–1.5)
CRYSTALS UA POCT, CRYSPOCT: NEGATIVE
EGFR (POC): 94.4
EPITHELIAL CELLS POCT: NORMAL
GLUCOSE POC: 220 MG/DL (ref 75–110)
GLUCOSE UR-MCNC: NORMAL MG/DL
GRAN# POC: 5.5 K/UL (ref 2–7.8)
GRAN% POC: 76.4 % (ref 37–92)
HBA1C MFR BLD HPLC: 9.5 % (ref 4.5–5.7)
HCT VFR BLD CALC: 31.9 % (ref 37–51)
HDLC SERPL-MCNC: 40 MG/DL (ref 35–130)
HGB BLD-MCNC: 10.1 G/DL (ref 12–18)
KETONES P FAST UR STRIP-MCNC: NORMAL MG/DL
LDL CHOLESTEROL POC: 75.4 MG/DL (ref 0–130)
LY# POC: 1.3 K/UL (ref 0.6–4.1)
LY% POC: 19.9 % (ref 10–58.5)
MCH RBC QN: 22.6 PG (ref 26–32)
MCHC RBC-ENTMCNC: 31.6 G/DL (ref 30–36)
MCV RBC: 72 FL (ref 80–97)
MICROALBUMIN UR TEST STR-MCNC: NORMAL MG/L (ref 0–20)
MID #, POC: 0.2 K/UL (ref 0–1.8)
MID% POC: 3.7 % (ref 0.1–24)
MUCUS UA POCT, MUCPOCT: NORMAL
PH UR STRIP: 6 [PH] (ref 5–7)
PLATELET # BLD: 312 K/UL (ref 140–440)
POTASSIUM SERPL-SCNC: 5.1 MMOL/L (ref 3.6–5)
PROT SERPL-MCNC: 7.8 G/DL (ref 6.3–8.2)
PROT UR QL STRIP: NORMAL
RBC # BLD: 4.46 M/UL (ref 4.2–6.3)
RBC UA POCT, RBCPOCT: NORMAL
SODIUM SERPL-SCNC: 140 MMOL/L (ref 137–145)
SP GR UR STRIP: 1.01 (ref 1.01–1.02)
TCHOL/HDL RATIO (POC): 3.4 (ref 0–4)
TOTAL BILIRUBIN POC: 0.5 MG/DL (ref 0.2–1.3)
TRICH UA POCT, TRICHPOC: NEGATIVE
TRIGL SERPL-MCNC: 93 MG/DL (ref 0–200)
TSH BLD-ACNC: 0.93 UIU/ML (ref 0.4–4.2)
UA UROBILINOGEN AMB POC: NORMAL (ref 0.2–1)
URINALYSIS CLARITY POC: CLEAR
URINALYSIS COLOR POC: NORMAL
URINE BLOOD POC: NEGATIVE
URINE CULT COMMENT, POCT: NORMAL
URINE LEUKOCYTES POC: NEGATIVE
URINE NITRITES POC: NEGATIVE
VLDLC SERPL CALC-MCNC: 18.6 MG/DL
WBC # BLD: 7 K/UL (ref 4.1–10.9)
WBC UA POCT, WBCPOCT: 0
YEAST UA POCT, YEASTPOC: NEGATIVE

## 2017-11-17 RX ORDER — GLIPIZIDE 5 MG/1
5 TABLET ORAL 2 TIMES DAILY
Qty: 30 TAB | Refills: 0 | Status: SHIPPED | OUTPATIENT
Start: 2017-11-17 | End: 2017-11-28 | Stop reason: SDUPTHER

## 2017-11-17 RX ORDER — TRAMADOL HYDROCHLORIDE 50 MG/1
TABLET ORAL
Refills: 0 | COMMUNITY
Start: 2017-09-21 | End: 2017-11-17 | Stop reason: ALTCHOICE

## 2017-11-17 NOTE — PATIENT INSTRUCTIONS
Vaccine Information Statement    Influenza (Flu) Vaccine (Inactivated or Recombinant): What you need to know    Many Vaccine Information Statements are available in Urdu and other languages. See www.immunize.org/vis  Hojas de Información Sobre Vacunas están disponibles en Español y en muchos otros idiomas. Visite www.immunize.org/vis    1. Why get vaccinated? Influenza (flu) is a contagious disease that spreads around the [place] every year, usually between October and May. Flu is caused by influenza viruses, and is spread mainly by coughing, sneezing, and close contact. Anyone can get flu. Flu strikes suddenly and can last several days. Symptoms vary by age, but can include:   fever/chills   sore throat   muscle aches   fatigue   cough   headache    runny or stuffy nose    Flu can also lead to pneumonia and blood infections, and cause diarrhea and seizures in children. If you have a medical condition, such as heart or lung disease, flu can make it worse. Flu is more dangerous for some people. Infants and young children, people 72years of age and older, pregnant women, and people with certain health conditions or a weakened immune system are at greatest risk. Each year thousands of people in the [facility] die from flu, and many more are hospitalized. Flu vaccine can:   keep you from getting flu,   make flu less severe if you do get it, and   keep you from spreading flu to your family and other people. 2. Inactivated and recombinant flu vaccines    A dose of flu vaccine is recommended every flu season. Children 6 months through 6years of age may need two doses during the same flu season. Everyone else needs only one dose each flu season.        Some inactivated flu vaccines contain a very small amount of a mercury-based preservative called thimerosal. Studies have not shown thimerosal in vaccines to be harmful, but flu vaccines that do not contain thimerosal are available. There is no live flu virus in flu shots. They cannot cause the flu. There are many flu viruses, and they are always changing. Each year a new flu vaccine is made to protect against three or four viruses that are likely to cause disease in the upcoming flu season. But even when the vaccine doesnt exactly match these viruses, it may still provide some protection    Flu vaccine cannot prevent:   flu that is caused by a virus not covered by the vaccine, or   illnesses that look like flu but are not. It takes about 2 weeks for protection to develop after vaccination, and protection lasts through the flu season. 3. Some people should not get this vaccine    Tell the person who is giving you the vaccine:     If you have any severe, life-threatening allergies. If you ever had a life-threatening allergic reaction after a dose of flu vaccine, or have a severe allergy to any part of this vaccine, you may be advised not to get vaccinated. Most, but not all, types of flu vaccine contain a small amount of egg protein.  If you ever had Guillain-Barré Syndrome (also called GBS). Some people with a history of GBS should not get this vaccine. This should be discussed with your doctor.  If you are not feeling well. It is usually okay to get flu vaccine when you have a mild illness, but you might be asked to come back when you feel better. 4. Risks of a vaccine reaction    With any medicine, including vaccines, there is a chance of reactions. These are usually mild and go away on their own, but serious reactions are also possible. Most people who get a flu shot do not have any problems with it.      Minor problems following a flu shot include:    soreness, redness, or swelling where the shot was given     hoarseness   sore, red or itchy eyes   cough   fever   aches   headache   itching   fatigue  If these problems occur, they usually begin soon after the shot and last 1 or 2 days. More serious problems following a flu shot can include the following:     There may be a small increased risk of Guillain-Barré Syndrome (GBS) after inactivated flu vaccine. This risk has been estimated at 1 or 2 additional cases per million people vaccinated. This is much lower than the risk of severe complications from flu, which can be prevented by flu vaccine.  Young children who get the flu shot along with pneumococcal vaccine (PCV13) and/or DTaP vaccine at the same time might be slightly more likely to have a seizure caused by fever. Ask your doctor for more information. Tell your doctor if a child who is getting flu vaccine has ever had a seizure. Problems that could happen after any injected vaccine:      People sometimes faint after a medical procedure, including vaccination. Sitting or lying down for about 15 minutes can help prevent fainting, and injuries caused by a fall. Tell your doctor if you feel dizzy, or have vision changes or ringing in the ears.  Some people get severe pain in the shoulder and have difficulty moving the arm where a shot was given. This happens very rarely.  Any medication can cause a severe allergic reaction. Such reactions from a vaccine are very rare, estimated at about 1 in a million doses, and would happen within a few minutes to a few hours after the vaccination. As with any medicine, there is a very remote chance of a vaccine causing a serious injury or death. The safety of vaccines is always being monitored. For more information, visit: www.cdc.gov/vaccinesafety/    5. What if there is a serious reaction? What should I look for?  Look for anything that concerns you, such as signs of a severe allergic reaction, very high fever, or unusual behavior.     Signs of a severe allergic reaction can include hives, swelling of the face and throat, difficulty breathing, a fast heartbeat, dizziness, and weakness - usually within a few minutes to a few hours after the vaccination. What should I do?  If you think it is a severe allergic reaction or other emergency that cant wait, call 9-1-1 and get the person to the nearest hospital. Otherwise, call your doctor.  Reactions should be reported to the Vaccine Adverse Event Reporting System (VAERS). Your doctor should file this report, or you can do it yourself through  the VAERS web site at www.vaers. [facility].gov, or by calling 1-199.684.6260. VAERS does not give medical advice. 6. The National Vaccine Injury Compensation Program    The [facility] Vaccine Injury Compensation Program (VICP) is a federal program that was created to compensate people who may have been injured by certain vaccines. Persons who believe they may have been injured by a vaccine can learn about the program and about filing a claim by calling 8-610.239.8421 or visiting the [employer] website at www.[facility].gov/vaccinecompensation. There is a time limit to file a claim for compensation. 7. How can I learn more?  Ask your healthcare provider. He or she can give you the vaccine package insert or suggest other sources of information.  Call your local or state health department.  Contact the Centers for Disease Control and Prevention (CDC):  - Call 4-630.738.8973 (1-800-CDC-INFO) or  - Visit CDCs website at www.cdc.gov/flu    Vaccine Information Statement   Inactivated Influenza Vaccine   8/7/2015  [id number]    Department of Health and Human Services  Centers for Disease Control and Prevention    Office Use Only       Atrial Fibrillation: Care Instructions  Your Care Instructions    Atrial fibrillation is an irregular and often fast heartbeat. Treating this condition is important for several reasons. It can cause blood clots, which can travel from your heart to your brain and cause a stroke. If you have a fast heartbeat, you may feel lightheaded, dizzy, and weak.  An irregular heartbeat can also increase your risk for heart failure. Atrial fibrillation is often the result of another heart condition, such as high blood pressure or coronary artery disease. Making changes to improve your heart condition will help you stay healthy and active. Follow-up care is a key part of your treatment and safety. Be sure to make and go to all appointments, and call your doctor if you are having problems. It's also a good idea to know your test results and keep a list of the medicines you take. How can you care for yourself at home? Medicines  ? · Take your medicines exactly as prescribed. Call your doctor if you think you are having a problem with your medicine. You will get more details on the specific medicines your doctor prescribes. ? · If your doctor has given you a blood thinner to prevent a stroke, be sure you get instructions about how to take your medicine safely. Blood thinners can cause serious bleeding problems. ? · Do not take any vitamins, over-the-counter drugs, or herbal products without talking to your doctor first.   ? Lifestyle changes  ? · Do not smoke. Smoking can increase your chance of a stroke and heart attack. If you need help quitting, talk to your doctor about stop-smoking programs and medicines. These can increase your chances of quitting for good. ? · Eat a heart-healthy diet. ? · Stay at a healthy weight. Lose weight if you need to.   ? · Limit alcohol to 2 drinks a day for men and 1 drink a day for women. Too much alcohol can cause health problems. ? · Avoid colds and flu. Get a pneumococcal vaccine shot. If you have had one before, ask your doctor whether you need another dose. Get a flu shot every year. If you must be around people with colds or flu, wash your hands often. Activity  ? · If your doctor recommends it, get more exercise. Walking is a good choice. Bit by bit, increase the amount you walk every day. Try for at least 30 minutes on most days of the week.  You also may want to swim, bike, or do other activities. Your doctor may suggest that you join a cardiac rehabilitation program so that you can have help increasing your physical activity safely. ? · Start light exercise if your doctor says it is okay. Even a small amount will help you get stronger, have more energy, and manage stress. Walking is an easy way to get exercise. Start out by walking a little more than you did in the hospital. Gradually increase the amount you walk. ? · When you exercise, watch for signs that your heart is working too hard. You are pushing too hard if you cannot talk while you are exercising. If you become short of breath or dizzy or have chest pain, sit down and rest immediately. ? · Check your pulse regularly. Place two fingers on the artery at the palm side of your wrist, in line with your thumb. If your heartbeat seems uneven or fast, talk to your doctor. When should you call for help? Call 911 anytime you think you may need emergency care. For example, call if:  ? · You have symptoms of a heart attack. These may include:  ¨ Chest pain or pressure, or a strange feeling in the chest.  ¨ Sweating. ¨ Shortness of breath. ¨ Nausea or vomiting. ¨ Pain, pressure, or a strange feeling in the back, neck, jaw, or upper belly or in one or both shoulders or arms. ¨ Lightheadedness or sudden weakness. ¨ A fast or irregular heartbeat. After you call 911, the  may tell you to chew 1 adult-strength or 2 to 4 low-dose aspirin. Wait for an ambulance. Do not try to drive yourself. ? · You have symptoms of a stroke. These may include:  ¨ Sudden numbness, tingling, weakness, or loss of movement in your face, arm, or leg, especially on only one side of your body. ¨ Sudden vision changes. ¨ Sudden trouble speaking. ¨ Sudden confusion or trouble understanding simple statements. ¨ Sudden problems with walking or balance. ¨ A sudden, severe headache that is different from past headaches.    ? · You passed out (lost consciousness). ?Call your doctor now or seek immediate medical care if:  ? · You have new or increased shortness of breath. ? · You feel dizzy or lightheaded, or you feel like you may faint. ? · Your heart rate becomes irregular. ? · You can feel your heart flutter in your chest or skip heartbeats. Tell your doctor if these symptoms are new or worse. ? Watch closely for changes in your health, and be sure to contact your doctor if you have any problems. Where can you learn more? Go to http://isra-hadley.info/. Enter U020 in the search box to learn more about \"Atrial Fibrillation: Care Instructions. \"  Current as of: September 21, 2016  Content Version: 11.4  © 1447-7191 [employer]. Care instructions adapted under license by [employer] (which disclaims liability or warranty for this information). If you have questions about a medical condition or this instruction, always ask your healthcare professional. [address] any warranty or liability for your use of this information.

## 2017-11-17 NOTE — MR AVS SNAPSHOT
Visit Information Date & Time Provider Department Dept. Phone Encounter #  
 11/17/2017 11:00 AM Maria Eugenia Acosta MD [place] [id number] [phone] [id number] Follow-up Instructions Return in about 3 months (around 2/17/2018). Upcoming Health Maintenance Date Due Hepatitis C Screening 1955 FOOT EXAM Q1 7/17/1963 MICROALBUMIN Q1 7/17/1963 EYE EXAM RETINAL OR DILATED Q1 7/17/1963 DTaP/Tdap/Td series (1 - Tdap) 7/17/1974 LIPID PANEL Q1 10/26/2011 ZOSTER VACCINE AGE 60> 5/17/2013 HEMOGLOBIN A1C Q6M 4/2/2016 Influenza Age 5 to Adult 8/1/2017 FOBT Q 1 YEAR AGE 50-75 7/26/2018 Allergies as of 11/17/2017  Review Complete On: 11/17/2017 By: Maria Eugenia Acosta MD  
  
 Severity Noted Reaction Type Reactions Demerol [Meperidine]  10/25/2010    Hives Pcn [Penicillins]  10/25/2010    Hives Current Immunizations  Reviewed on 12/9/2015 Name Date Influenza Vaccine 10/1/2014 Influenza Vaccine (Quad) PF  Incomplete, 10/5/2015  2:22 PM  
 Influenza Vaccine Whole 10/1/2010 Pneumococcal Polysaccharide (PPSV-23) 10/22/2014 10:00 AM  
  
 Not reviewed this visit You Were Diagnosed With   
  
 Codes Comments Uncontrolled type 2 diabetes mellitus without complication, without long-term current use of insulin ([facility]    -  Primary ICD-10-CM: E11.65 ICD-9-CM: 250.02 Essential hypertension     ICD-10-CM: I10 
ICD-9-CM: 401.9 Paroxysmal atrial fibrillation (HCC)     ICD-10-CM: I48.0 ICD-9-CM: 427.31 Gastrointestinal hemorrhage with melena     ICD-10-CM: K92.1 ICD-9-CM: 578.1 Encounter for immunization     ICD-10-CM: Z05 ICD-9-CM: V03.89 Vitals BP Pulse Temp Resp Height(growth percentile) Weight(growth percentile) (!) 150/92 79 97 °F (36.1 °C) (Oral) 16 6' (1.829 m) 209 lb 3.2 oz (94.9 kg) SpO2 BMI Smoking Status 99% 28.37 kg/m2 Never Smoker Vitals History BMI and BSA Data Body Mass Index Body Surface Area  
 28.37 kg/m 2 2.2 m 2 Preferred Pharmacy Pharmacy Name Phone [id number] [phone] [facility] [phone] Your Updated Medication List  
  
   
This list is accurate as of: 11/17/17 11:47 AM.  Always use your most recent med list.  
  
  
  
  
 dilTIAZem  mg XR capsule Commonly known as:  DILACOR XR Take 120 mg by mouth daily. glipiZIDE 5 mg tablet Commonly known as:  Denis Helling Take 1 Tab by mouth two (2) times a day. lisinopril 20 mg tablet Commonly known as:  Denver Escort Take 1 Tab by mouth daily. Genora Shayne  
by Does Not Apply route. Prescriptions Sent to Pharmacy Refills  
 glipiZIDE (GLUCOTROL) 5 mg tablet 0 Sig: Take 1 Tab by mouth two (2) times a day. Class: Normal  
 Pharmacy: [address] Ph #: [phone] Route: Oral  
  
We Performed the Following AMB POC COMPLETE CBC,AUTOMATED ENTER [id number] CPT(R)] AMB POC COMPREHENSIVE METABOLIC PANEL [21822 CPT(R)] AMB POC HEMOGLOBIN A1C [77277 CPT(R)] AMB POC LIPID PROFILE [30468 CPT(R)] AMB POC TSH [78920 CPT(R)] AMB POC URINALYSIS DIP STICK AUTO W/ MICRO  [97601 CPT(R)] AMB POC URINE, MICROALBUMIN, SEMIQUANT (1 RESULT) [63725 CPT(R)] INFLUENZA VIRUS VAC QUAD,SPLIT,PRESV FREE SYRINGE IM [id number] CPT(R)] MI COLLECTION VENOUS BLOOD,VENIPUNCTURE [id number] CPT(R)] MI IMMUNIZ ADMIN,1 SINGLE/COMB VAC/TOXOID M0330025 CPT(R)] Follow-up Instructions Return in about 3 months (around 2/17/2018). Patient Instructions Vaccine Information Statement Influenza (Flu) Vaccine (Inactivated or Recombinant): What you need to know Many Vaccine Information Statements are available in Icelandic and other languages. See www.immunize.org/vis Hojas de Información Sobre Vacunas están disponibles en Español y en muchos otros idiomas. Visite www.immunize.org/vis 1. Why get vaccinated? Influenza (flu) is a contagious disease that spreads around the [place] every year, usually between October and May. Flu is caused by influenza viruses, and is spread mainly by coughing, sneezing, and close contact. Anyone can get flu. Flu strikes suddenly and can last several days. Symptoms vary by age, but can include: 
 fever/chills  sore throat  muscle aches  fatigue  cough  headache  runny or stuffy nose Flu can also lead to pneumonia and blood infections, and cause diarrhea and seizures in children. If you have a medical condition, such as heart or lung disease, flu can make it worse. Flu is more dangerous for some people. Infants and young children, people 72years of age and older, pregnant women, and people with certain health conditions or a weakened immune system are at greatest risk. Each year thousands of people in the [facility] die from flu, and many more are hospitalized. Flu vaccine can: 
 keep you from getting flu, 
 make flu less severe if you do get it, and 
 keep you from spreading flu to your family and other people. 2. Inactivated and recombinant flu vaccines A dose of flu vaccine is recommended every flu season. Children 6 months through 6years of age may need two doses during the same flu season. Everyone else needs only one dose each flu season. Some inactivated flu vaccines contain a very small amount of a mercury-based preservative called thimerosal. Studies have not shown thimerosal in vaccines to be harmful, but flu vaccines that do not contain thimerosal are available. There is no live flu virus in flu shots. They cannot cause the flu. There are many flu viruses, and they are always changing.  Each year a new flu vaccine is made to protect against three or four viruses that are likely to cause disease in the upcoming flu season. But even when the vaccine doesnt exactly match these viruses, it may still provide some protection Flu vaccine cannot prevent: 
 flu that is caused by a virus not covered by the vaccine, or 
 illnesses that look like flu but are not. It takes about 2 weeks for protection to develop after vaccination, and protection lasts through the flu season. 3. Some people should not get this vaccine Tell the person who is giving you the vaccine:  If you have any severe, life-threatening allergies. If you ever had a life-threatening allergic reaction after a dose of flu vaccine, or have a severe allergy to any part of this vaccine, you may be advised not to get vaccinated. Most, but not all, types of flu vaccine contain a small amount of egg protein.  If you ever had Guillain-Barré Syndrome (also called GBS). Some people with a history of GBS should not get this vaccine. This should be discussed with your doctor.  If you are not feeling well. It is usually okay to get flu vaccine when you have a mild illness, but you might be asked to come back when you feel better. 4. Risks of a vaccine reaction With any medicine, including vaccines, there is a chance of reactions. These are usually mild and go away on their own, but serious reactions are also possible. Most people who get a flu shot do not have any problems with it. Minor problems following a flu shot include:  
 soreness, redness, or swelling where the shot was given  hoarseness  sore, red or itchy eyes  cough  fever  aches  headache  itching  fatigue If these problems occur, they usually begin soon after the shot and last 1 or 2 days. More serious problems following a flu shot can include the following:  There may be a small increased risk of Guillain-Barré Syndrome (GBS) after inactivated flu vaccine. This risk has been estimated at 1 or 2 additional cases per million people vaccinated. This is much lower than the risk of severe complications from flu, which can be prevented by flu vaccine.  Young children who get the flu shot along with pneumococcal vaccine (PCV13) and/or DTaP vaccine at the same time might be slightly more likely to have a seizure caused by fever. Ask your doctor for more information. Tell your doctor if a child who is getting flu vaccine has ever had a seizure. Problems that could happen after any injected vaccine:  People sometimes faint after a medical procedure, including vaccination. Sitting or lying down for about 15 minutes can help prevent fainting, and injuries caused by a fall. Tell your doctor if you feel dizzy, or have vision changes or ringing in the ears.  Some people get severe pain in the shoulder and have difficulty moving the arm where a shot was given. This happens very rarely.  Any medication can cause a severe allergic reaction. Such reactions from a vaccine are very rare, estimated at about 1 in a million doses, and would happen within a few minutes to a few hours after the vaccination. As with any medicine, there is a very remote chance of a vaccine causing a serious injury or death. The safety of vaccines is always being monitored. For more information, visit: www.cdc.gov/vaccinesafety/ 
 
5. What if there is a serious reaction? What should I look for?  Look for anything that concerns you, such as signs of a severe allergic reaction, very high fever, or unusual behavior. Signs of a severe allergic reaction can include hives, swelling of the face and throat, difficulty breathing, a fast heartbeat, dizziness, and weakness  usually within a few minutes to a few hours after the vaccination. What should I do?  
 
 If you think it is a severe allergic reaction or other emergency that cant wait, call 9-1-1 and get the person to the nearest hospital. Otherwise, call your doctor.  Reactions should be reported to the Vaccine Adverse Event Reporting System (VAERS). Your doctor should file this report, or you can do it yourself through  the VAERS web site at www.vaers. [facility].gov, or by calling 3-232.313.9194. VAERS does not give medical advice. 6. The National Vaccine Injury Compensation Program 
 
The [facility] Vaccine Injury Compensation Program (VICP) is a federal program that was created to compensate people who may have been injured by certain vaccines. Persons who believe they may have been injured by a vaccine can learn about the program and about filing a claim by calling 1-682.731.2814 or visiting the [employer] website at www.[facility].gov/vaccinecompensation. There is a time limit to file a claim for compensation. 7. How can I learn more?  Ask your healthcare provider. He or she can give you the vaccine package insert or suggest other sources of information.  Call your local or state health department.  Contact the Centers for Disease Control and Prevention (CDC): 
- Call 7-892.980.2178 (1-800-CDC-INFO) or 
- Visit CDCs website at www.cdc.gov/flu Vaccine Information Statement Inactivated Influenza Vaccine 8/7/2015 
42 STACY Hauser Elinor 970TA-[address] [facility] and [employer] Centers for Disease Control and Prevention Office Use Only Atrial Fibrillation: Care Instructions Your Care Instructions Atrial fibrillation is an irregular and often fast heartbeat. Treating this condition is important for several reasons. It can cause blood clots, which can travel from your heart to your brain and cause a stroke. If you have a fast heartbeat, you may feel lightheaded, dizzy, and weak. An irregular heartbeat can also increase your risk for heart failure.  
Atrial fibrillation is often the result of another heart condition, such as high blood pressure or coronary artery disease. Making changes to improve your heart condition will help you stay healthy and active. Follow-up care is a key part of your treatment and safety. Be sure to make and go to all appointments, and call your doctor if you are having problems. It's also a good idea to know your test results and keep a list of the medicines you take. How can you care for yourself at home? Medicines ? · Take your medicines exactly as prescribed. Call your doctor if you think you are having a problem with your medicine. You will get more details on the specific medicines your doctor prescribes. ? · If your doctor has given you a blood thinner to prevent a stroke, be sure you get instructions about how to take your medicine safely. Blood thinners can cause serious bleeding problems. ? · Do not take any vitamins, over-the-counter drugs, or herbal products without talking to your doctor first. ? Lifestyle changes ? · Do not smoke. Smoking can increase your chance of a stroke and heart attack. If you need help quitting, talk to your doctor about stop-smoking programs and medicines. These can increase your chances of quitting for good. ? · Eat a heart-healthy diet. ? · Stay at a healthy weight. Lose weight if you need to.  
? · Limit alcohol to 2 drinks a day for men and 1 drink a day for women. Too much alcohol can cause health problems. ? · Avoid colds and flu. Get a pneumococcal vaccine shot. If you have had one before, ask your doctor whether you need another dose. Get a flu shot every year. If you must be around people with colds or flu, wash your hands often. Activity ? · If your doctor recommends it, get more exercise. Walking is a good choice. Bit by bit, increase the amount you walk every day. Try for at least 30 minutes on most days of the week. You also may want to swim, bike, or do other activities.  Your doctor may suggest that you join a cardiac rehabilitation program so that you can have help increasing your physical activity safely. ? · Start light exercise if your doctor says it is okay. Even a small amount will help you get stronger, have more energy, and manage stress. Walking is an easy way to get exercise. Start out by walking a little more than you did in the hospital. Gradually increase the amount you walk. ? · When you exercise, watch for signs that your heart is working too hard. You are pushing too hard if you cannot talk while you are exercising. If you become short of breath or dizzy or have chest pain, sit down and rest immediately. ? · Check your pulse regularly. Place two fingers on the artery at the palm side of your wrist, in line with your thumb. If your heartbeat seems uneven or fast, talk to your doctor. When should you call for help? Call 911 anytime you think you may need emergency care. For example, call if: 
? · You have symptoms of a heart attack. These may include: ¨ Chest pain or pressure, or a strange feeling in the chest. 
¨ Sweating. ¨ Shortness of breath. ¨ Nausea or vomiting. ¨ Pain, pressure, or a strange feeling in the back, neck, jaw, or upper belly or in one or both shoulders or arms. ¨ Lightheadedness or sudden weakness. ¨ A fast or irregular heartbeat. After you call 911, the  may tell you to chew 1 adult-strength or 2 to 4 low-dose aspirin. Wait for an ambulance. Do not try to drive yourself. ? · You have symptoms of a stroke. These may include: 
¨ Sudden numbness, tingling, weakness, or loss of movement in your face, arm, or leg, especially on only one side of your body. ¨ Sudden vision changes. ¨ Sudden trouble speaking. ¨ Sudden confusion or trouble understanding simple statements. ¨ Sudden problems with walking or balance. ¨ A sudden, severe headache that is different from past headaches. ? · You passed out (lost consciousness). ?Call your doctor now or seek immediate medical care if: 
? · You have new or increased shortness of breath. ? · You feel dizzy or lightheaded, or you feel like you may faint. ? · Your heart rate becomes irregular. ? · You can feel your heart flutter in your chest or skip heartbeats. Tell your doctor if these symptoms are new or worse. ? Watch closely for changes in your health, and be sure to contact your doctor if you have any problems. Where can you learn more? Go to http://isra-hadley.info/. Enter U020 in the search box to learn more about \"Atrial Fibrillation: Care Instructions. \" Current as of: September 21, 2016 Content Version: 11.4 © 3572-8525 [employer]. Care instructions adapted under license by [employer] (which disclaims liability or warranty for this information). If you have questions about a medical condition or this instruction, always ask your healthcare professional. [place] [address] any warranty or liability for your use of this information. Introducing [facility]! [employer] introduces [employer] patient portal. Now you can access parts of your medical record, email your doctor's office, and request medication refills online. 1. In your internet browser, go to [email or web address] 2. Click on the First Time User? Click Here link in the Sign In box. You will see the New Member Sign Up page. 3. Enter your [employer] Access Code exactly as it appears below. You will not need to use this code after youve completed the sign-up process. If you do not sign up before the expiration date, you must request a new code. · [employer] Online Access Code: [email or web address] Expires: 2/12/2018  2:43 PM 
 
4. Enter the last four digits of your Social Security Number (xxxx) and Date of Birth (mm/dd/yyyy) as indicated and click Submit. You will be taken to the next sign-up page. 5. Create a [employer] ID. This will be your [employer] login ID and cannot be changed, so think of one that is secure and easy to remember. 6. Create a [employer] password. You can change your password at any time. 7. Enter your Password Reset Question and Answer. This can be used at a later time if you forget your password. 8. Enter your e-mail address. You will receive e-mail notification when new information is available in [address] 9. Click Sign Up. You can now view and download portions of your medical record. 10. Click the Download Summary menu link to download a portable copy of your medical information. If you have questions, please visit the Frequently Asked Questions section of the [employer] website. Remember, [employer] is NOT to be used for urgent needs. For medical emergencies, dial 911. Now available from your iPhone and Android! Please provide this summary of care documentation to your next provider. Your primary care clinician is listed as LAUREN Cam. If you have any questions after today's visit, please call [phone].

## 2017-11-17 NOTE — PROGRESS NOTES
Identified pt with two pt identifiers(name and ). Reviewed record in preparation for visit and have obtained necessary documentation. Chief Complaint   Patient presents with    Diabetes     pt stopped taking metformin 17 and reports feeling 100% better     GI Problem     f/u; pt underwent enteroscopy with Dr. [place]; Health Maintenance Due   Topic    Hepatitis C Screening     FOOT EXAM Q1     MICROALBUMIN Q1     EYE EXAM RETINAL OR DILATED Q1     DTaP/Tdap/Td series (1 - Tdap)    LIPID PANEL Q1     ZOSTER VACCINE AGE 60>     HEMOGLOBIN A1C Q6M     Influenza Age 5 to Adult        Coordination of Care Questionnaire:  :   1) Have you been to an emergency room, urgent care clinic since your last visit? no   Hospitalized since your last visit? no             2. Have seen or consulted any other health care provider since your last visit? YES  If yes, where when, and reason for visit? Dr. Konrad Mclain (cardiologist) appt next Wednesday  Dr. [place]       Patient is accompanied by self I have received verbal consent from Mando Officer to discuss any/all medical information while they are present in the room. Mando Officer is a 57 y.o. male who presents for routine immunizations. He denies any symptoms , reactions or allergies that would exclude them from being immunized today. Risks and adverse reactions were discussed and the VIS was given to them. All questions were addressed. He was observed for 15 min post injection. There were no reactions observed.     Laquita Redmond LPN
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
## 2017-11-21 ENCOUNTER — TELEPHONE (OUTPATIENT)
Dept: INTERNAL MEDICINE CLINIC | Age: 62
End: 2017-11-21

## 2017-11-21 NOTE — PROGRESS NOTES
Blood sugar was 220 with an A1c elevated at 9.5.   Increase glipizide to 2 tablets twice daily  Recheck FBS in 2 weeks

## 2017-11-21 NOTE — TELEPHONE ENCOUNTER
----- Message from Balbir Valentin MD sent at 11/21/2017 11:43 AM EST -----  Blood sugar was 220 with an A1c elevated at 9.5.   Increase glipizide to 2 tablets twice daily  Recheck FBS in 2 weeks

## 2017-11-28 RX ORDER — LISINOPRIL 40 MG/1
40 TABLET ORAL DAILY
Qty: 30 TAB | Refills: 3 | Status: SHIPPED | OUTPATIENT
Start: 2017-11-28 | End: 2018-03-22 | Stop reason: SINTOL

## 2017-11-28 RX ORDER — GLIPIZIDE 10 MG/1
10 TABLET ORAL 2 TIMES DAILY
Qty: 60 TAB | Refills: 3 | Status: SHIPPED | OUTPATIENT
Start: 2017-11-28 | End: 2018-02-23 | Stop reason: SDUPTHER

## 2017-11-28 NOTE — TELEPHONE ENCOUNTER
Requested Prescriptions     Pending Prescriptions Disp Refills    glipiZIDE (GLUCOTROL) 10 mg tablet 60 Tab 3     Sig: Take 1 Tab by mouth two (2) times a day.  lisinopril (PRINIVIL, ZESTRIL) 40 mg tablet 30 Tab 3     Sig: Take 1 Tab by mouth daily.        Last Refill: Needs new Rxs sent in- meds have been increased  Last visit:11/17/2017

## 2017-12-05 ENCOUNTER — LAB ONLY (OUTPATIENT)
Dept: INTERNAL MEDICINE CLINIC | Age: 62
End: 2017-12-05

## 2017-12-05 LAB — GLUCOSE POC: 211 MG/DL (ref 75–110)

## 2017-12-05 NOTE — PROGRESS NOTES
Blood sugar is 211. Continue current dose of glipizide. Add Januvia via 100 mg daily.   Recheck blood sugar 2 weeks

## 2017-12-07 ENCOUNTER — TELEPHONE (OUTPATIENT)
Dept: INTERNAL MEDICINE CLINIC | Age: 62
End: 2017-12-07

## 2017-12-07 NOTE — TELEPHONE ENCOUNTER
----- Message from Jayde Wang MD sent at 12/5/2017  4:30 PM EST -----  Blood sugar is 211. Continue current dose of glipizide. Add Januvia via 100 mg daily.   Recheck blood sugar 2 weeks

## 2018-01-26 RX ORDER — SITAGLIPTIN 100 MG/1
TABLET, FILM COATED ORAL
Qty: 30 TAB | Refills: 1 | Status: SHIPPED | OUTPATIENT
Start: 2018-01-26 | End: 2018-02-16 | Stop reason: ALTCHOICE

## 2018-01-30 ENCOUNTER — TELEPHONE (OUTPATIENT)
Dept: INTERNAL MEDICINE CLINIC | Age: 63
End: 2018-01-30

## 2018-01-30 NOTE — TELEPHONE ENCOUNTER
Patients wife states that he went to  his [facility] [place] and was told it would cost him over $700 would like to know if we have discount card that can be given to him or if there is an alternative medication that he can take.

## 2018-02-01 ENCOUNTER — LAB ONLY (OUTPATIENT)
Dept: INTERNAL MEDICINE CLINIC | Age: 63
End: 2018-02-01

## 2018-02-01 LAB — GLUCOSE POC: 224 MG/DL (ref 75–110)

## 2018-02-05 NOTE — PROGRESS NOTES
His fasting blood sugar is 224. There has been no improvement with the addition of Januvia and I would subsequently stop this. Continue glipizide. Would like him to start on Invokana 300 mg daily. He will need a discount card for this when he gets it filled.     Recheck fasting blood sugar 2 weeks

## 2018-02-07 NOTE — TELEPHONE ENCOUNTER
----- Message from Chin Mendez MD sent at 2/5/2018 11:17 AM EST -----  His fasting blood sugar is 224. There has been no improvement with the addition of Januvia and I would subsequently stop this. Continue glipizide. Would like him to start on Invokana 300 mg daily. He will need a discount card for this when he gets it filled. Recheck fasting blood sugar 2 weeks          Requested Prescriptions     Pending Prescriptions Disp Refills    canagliflozin (INVOKANA) 300 mg tablet 30 Tab 1     Sig: Take 1 Tab by mouth Daily (before breakfast).

## 2018-02-07 NOTE — PROGRESS NOTES
Patient notified of lab results. Savings card for patient out front and order sent in for new medication.

## 2018-02-16 ENCOUNTER — OFFICE VISIT (OUTPATIENT)
Dept: INTERNAL MEDICINE CLINIC | Age: 63
End: 2018-02-16

## 2018-02-16 VITALS
DIASTOLIC BLOOD PRESSURE: 90 MMHG | WEIGHT: 221.4 LBS | TEMPERATURE: 98.2 F | HEIGHT: 72 IN | OXYGEN SATURATION: 98 % | HEART RATE: 71 BPM | BODY MASS INDEX: 29.99 KG/M2 | RESPIRATION RATE: 18 BRPM | SYSTOLIC BLOOD PRESSURE: 150 MMHG

## 2018-02-16 DIAGNOSIS — I10 ESSENTIAL HYPERTENSION: Primary | ICD-10-CM

## 2018-02-16 RX ORDER — INSULIN PUMP SYRINGE, 3 ML
EACH MISCELLANEOUS
Qty: 1 KIT | Refills: 0 | Status: SHIPPED | OUTPATIENT
Start: 2018-02-16

## 2018-02-16 NOTE — PROGRESS NOTES
Lucien Perez is a 57 y.o. male  . Chief Complaint   Patient presents with    Diabetes     3 MONTH FOLLOW UP     Visit Vitals    /90 (BP 1 Location: Right arm, BP Patient Position: Sitting)    Pulse 71    Temp 98.2 °F (36.8 °C) (Oral)    Resp 18    Ht 6' (1.829 m)    Wt 221 lb 6.4 oz (100.4 kg)    SpO2 98%    BMI 30.03 kg/m2     1. Have you been to the ER, urgent care clinic since your last visit? Hospitalized since your last visit? NO    2. Have you seen or consulted any other health care providers outside of the [address] [place] [address] since your last visit? Include any pap smears or colon screening.  NO

## 2018-02-16 NOTE — MR AVS SNAPSHOT
[facility] 
 
 
 [address] [address] [phone] Patient: Michaela Dorantes MRN: [id number] HMD:[birth year] Visit Information Date & Time Provider Department Dept. Phone Encounter #  
 2/16/2018 11:00 AM David [phone] [id number] Follow-up Instructions Return in about 4 weeks (around 3/16/2018). Your Appointments 2/21/2018  9:00 AM  
LAB with LAB ONLY  
[facility] ([facility]) Appt Note: fbs  
 [address] [id number] [address] [address] Upcoming Health Maintenance Date Due Hepatitis C Screening 1955 FOOT EXAM Q1 7/17/1963 EYE EXAM RETINAL OR DILATED Q1 7/17/1963 DTaP/Tdap/Td series (1 - Tdap) 7/17/1974 ZOSTER VACCINE AGE 60> 5/17/2013 HEMOGLOBIN A1C Q6M 5/17/2018 FOBT Q 1 YEAR AGE 50-75 7/26/2018 MICROALBUMIN Q1 11/17/2018 LIPID PANEL Q1 11/17/2018 Allergies as of 2/16/2018  Review Complete On: 2/16/2018 By: David Fernandez MD  
  
 Severity Noted Reaction Type Reactions Demerol [Meperidine]  10/25/2010    Hives Pcn [Penicillins]  10/25/2010    Hives Current Immunizations  Reviewed on 2/16/2018 Name Date Influenza Vaccine 10/1/2014 Influenza Vaccine (Quad) PF 11/17/2017, 10/5/2015  2:22 PM  
 Influenza Vaccine Whole 10/1/2010 Pneumococcal Polysaccharide (PPSV-23) 10/22/2014 10:00 AM  
  
 Reviewed by Donna Avery LPN on 4/84/8628 at 10:90 AM  
You Were Diagnosed With   
  
 Codes Comments Essential hypertension    -  Primary ICD-10-CM: I10 
ICD-9-CM: 401.9 Uncontrolled type 2 diabetes mellitus without complication, without long-term current use of insulin ([facility]     ICD-10-CM: E11.65 ICD-9-CM: 250.02 Vitals BP Pulse Temp Resp Height(growth percentile) Weight(growth percentile) 150/90 (BP 1 Location: Right arm, BP Patient Position: Sitting) 71 98.2 °F (36.8 °C) (Oral) 18 6' (1.829 m) 221 lb 6.4 oz (100.4 kg) SpO2 BMI Smoking Status 98% 30.03 kg/m2 Never Smoker Vitals History BMI and BSA Data Body Mass Index Body Surface Area 30.03 kg/m 2 2.26 m 2 Preferred Pharmacy Pharmacy Name Phone [facility] #[id number] [facility] [phone] Your Updated Medication List  
  
   
This list is accurate as of: 2/16/18 11:34 AM.  Always use your most recent med list.  
  
  
  
  
 Blood-Glucose Meter monitoring kit Commonly known as:  Heather Brown For once daily blood glucose checks  
  
 canagliflozin 300 mg tablet Commonly known as:  Fidelina Mancinistein Take 1 Tab by mouth Daily (before breakfast). dilTIAZem  mg XR capsule Commonly known as:  DILACOR XR Take 120 mg by mouth daily. glipiZIDE 10 mg tablet Commonly known as:  Tyson Gain Take 1 Tab by mouth two (2) times a day. glucose blood VI test strips strip Commonly known as:  ONETOUCH ULTRA TEST Check blood glucose once daily  
  
 lisinopril 40 mg tablet Commonly known as:  Myke Art Take 1 Tab by mouth daily. Prescriptions Sent to Pharmacy Refills Blood-Glucose Meter (ONETOUCH ULTRA2) monitoring kit 0 Sig: For once daily blood glucose checks Class: Normal  
 Pharmacy: [facility]/pharmacy #[id number] [facility] Ph #: [phone]  
 glucose blood VI test strips (ONETOUCH ULTRA TEST) strip 3 Sig: Check blood glucose once daily Class: Normal  
 Pharmacy: [place] [id number], [address] [place] [address] Ph #: [phone] Follow-up Instructions Return in about 4 weeks (around 3/16/2018). Introducing [facility]! Darcy Monroe introduces [employer] patient portal. Now you can access parts of your medical record, email your doctor's office, and request medication refills online. 1. In your internet browser, go to [email or web address] 2. Click on the First Time User? Click Here link in the Sign In box. You will see the New Member Sign Up page. 3. Enter your [employer] Access Code exactly as it appears below. You will not need to use this code after youve completed the sign-up process. If you do not sign up before the expiration date, you must request a new code. · [employer] Access Code: [email or web address] Expires: 5/17/2018 10:55 AM 
 
4. Enter the last four digits of your Social Security Number (xxxx) and Date of Birth (mm/dd/yyyy) as indicated and click Submit. You will be taken to the next sign-up page. 5. Create a [employer] ID. This will be your [employer] login ID and cannot be changed, so think of one that is secure and easy to remember. 6. Create a [employer] password. You can change your password at any time. 7. Enter your Password Reset Question and Answer. This can be used at a later time if you forget your password. 8. Enter your e-mail address. You will receive e-mail notification when new information is available in [address] 9. Click Sign Up. You can now view and download portions of your medical record. 10. Click the Download Summary menu link to download a portable copy of your medical information. If you have questions, please visit the Frequently Asked Questions section of the [employer] website. Remember, [employer] is NOT to be used for urgent needs. For medical emergencies, dial 911. Now available from your iPhone and Android! Please provide this summary of care documentation to your next provider. Your primary care clinician is listed as LAUREN Cam.  If you have any questions after today's visit, please call [phone].
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
## 2018-02-22 ENCOUNTER — LAB ONLY (OUTPATIENT)
Dept: INTERNAL MEDICINE CLINIC | Age: 63
End: 2018-02-22

## 2018-02-22 LAB — GLUCOSE POC: 195 MG/DL (ref 75–110)

## 2018-02-23 RX ORDER — GLIPIZIDE 10 MG/1
20 TABLET ORAL 2 TIMES DAILY
Qty: 120 TAB | Refills: 3 | Status: SHIPPED | OUTPATIENT
Start: 2018-02-23 | End: 2018-06-19 | Stop reason: SDUPTHER

## 2018-02-23 NOTE — TELEPHONE ENCOUNTER
----- Message from Donna Hernandez MD sent at 2/23/2018  7:10 AM EST -----  Fasting blood sugar is 195. Continue Invokana.   Increase glipizide to 10 mg take 2 tablets twice daily  Recheck fasting blood sugar 1 week      Last visit:2/16/2018      Requested Prescriptions     Pending Prescriptions Disp Refills    glipiZIDE (GLUCOTROL) 10 mg tablet 120 Tab 3     Sig: Indications: 2 tabs 2 times daily

## 2018-02-23 NOTE — PROGRESS NOTES
Fasting blood sugar is 195. Continue Invokana.   Increase glipizide to 10 mg take 2 tablets twice daily  Recheck fasting blood sugar 1 week

## 2018-03-02 ENCOUNTER — LAB ONLY (OUTPATIENT)
Dept: INTERNAL MEDICINE CLINIC | Age: 63
End: 2018-03-02

## 2018-03-02 LAB — GLUCOSE POC: 177 MG/DL (ref 75–110)

## 2018-03-07 NOTE — PROGRESS NOTES
Patient notified of lab results. Patient states he will have to check with his insurance company to see how much it would cost as the Invokana cost him 500 dollars.  States he will call once he has checked on prices

## 2018-03-22 ENCOUNTER — OFFICE VISIT (OUTPATIENT)
Dept: INTERNAL MEDICINE CLINIC | Age: 63
End: 2018-03-22

## 2018-03-22 VITALS
WEIGHT: 224 LBS | OXYGEN SATURATION: 97 % | DIASTOLIC BLOOD PRESSURE: 72 MMHG | HEART RATE: 69 BPM | SYSTOLIC BLOOD PRESSURE: 158 MMHG | BODY MASS INDEX: 30.34 KG/M2 | HEIGHT: 72 IN

## 2018-03-22 DIAGNOSIS — T46.4X5A ACE-INHIBITOR COUGH: ICD-10-CM

## 2018-03-22 DIAGNOSIS — R05.8 ACE-INHIBITOR COUGH: ICD-10-CM

## 2018-03-22 DIAGNOSIS — I10 ESSENTIAL HYPERTENSION: Primary | ICD-10-CM

## 2018-03-22 DIAGNOSIS — Z87.19 HISTORY OF PANCREATITIS: ICD-10-CM

## 2018-03-22 RX ORDER — DILTIAZEM HYDROCHLORIDE 360 MG/1
360 CAPSULE, EXTENDED RELEASE ORAL DAILY
Qty: 30 CAP | Refills: 0 | Status: SHIPPED | OUTPATIENT
Start: 2018-03-22 | End: 2018-04-18 | Stop reason: SDUPTHER

## 2018-03-22 NOTE — MR AVS SNAPSHOT
[address] [facility] 70 [address] [id number] [phone] Patient: Bryce Galvez MRN: [id number]:[id number] Visit Information Date & Time Provider Department Dept. Phone Encounter #  
 3/22/2018 10:40 AM Jaya Flores MD [facility] [id number] Follow-up Instructions Return in about 4 weeks (around 4/19/2018). Upcoming Health Maintenance Date Due Hepatitis C Screening 1955 FOOT EXAM Q1 7/17/1963 EYE EXAM RETINAL OR DILATED Q1 7/17/1963 DTaP/Tdap/Td series (1 - Tdap) 7/17/1974 ZOSTER VACCINE AGE 60> 5/17/2013 HEMOGLOBIN A1C Q6M 5/17/2018 FOBT Q 1 YEAR AGE 50-75 7/26/2018 MICROALBUMIN Q1 11/17/2018 LIPID PANEL Q1 11/17/2018 Allergies as of 3/22/2018  Review Complete On: 3/22/2018 By: Jaya Flores MD  
  
 Severity Noted Reaction Type Reactions Demerol [Meperidine]  10/25/2010    Hives Pcn [Penicillins]  10/25/2010    Hives Current Immunizations  Reviewed on 2/16/2018 Name Date Influenza Vaccine 10/1/2014 Influenza Vaccine (Quad) PF 11/17/2017, 10/5/2015  2:22 PM  
 Influenza Vaccine Whole 10/1/2010 Pneumococcal Polysaccharide (PPSV-23) 10/22/2014 10:00 AM  
  
 Not reviewed this visit You Were Diagnosed With   
  
 Codes Comments Essential hypertension    -  Primary ICD-10-CM: I10 
ICD-9-CM: 401.9 Uncontrolled type 2 diabetes mellitus without complication, without long-term current use of insulin ([facility]     ICD-10-CM: E11.65 ICD-9-CM: 250.02 History of pancreatitis     ICD-10-CM: Z87.19 ICD-9-CM: V12.79 Vitals BP Pulse Height(growth percentile) Weight(growth percentile) SpO2 BMI  
 158/72 (BP 1 Location: Right arm, BP Patient Position: Sitting) 69 6' (1.829 m) 224 lb (101.6 kg) 97% 30.38 kg/m2 Smoking Status Never Smoker BMI and BSA Data Body Mass Index Body Surface Area  
 30.38 kg/m 2 2.27 m 2 Preferred Pharmacy Pharmacy Name Phone [phone] [facility] [phone] Your Updated Medication List  
  
   
This list is accurate as of 3/22/18 11:09 AM.  Always use your most recent med list.  
  
  
  
  
 Blood-Glucose Meter monitoring kit Commonly known as:  Karie Mendoza For once daily blood glucose checks  
  
 canagliflozin 300 mg tablet Commonly known as:  Alix De Leon Take 1 Tab by mouth Daily (before breakfast). dilTIAZem 360 mg ER capsule Commonly known as:  [facility] Take 1 Cap by mouth daily. glipiZIDE 10 mg tablet Commonly known as:  Sanjuanita Win Take 2 Tabs by mouth two (2) times a day. Indications: 2 tabs 2 times daily  
  
 glucose blood VI test strips strip Commonly known as:  ONETOUCH ULTRA TEST Check blood glucose once daily Prescriptions Sent to Pharmacy Refills  
 dilTIAZem (TIAZAC) 360 mg ER capsule 0 Sig: Take 1 Cap by mouth daily. Class: Normal  
 Pharmacy: [address] #: [phone] Route: Oral  
  
Follow-up Instructions Return in about 4 weeks (around 4/19/2018). Introducing [facility] SERVICES! Ana Paula Atwood introduces [employer] patient portal. Now you can access parts of your medical record, email your doctor's office, and request medication refills online. 1. In your internet browser, go to [email or web address] 2. Click on the First Time User? Click Here link in the Sign In box. You will see the New Member Sign Up page. 3. Enter your Veracyte Access Code exactly as it appears below. You will not need to use this code after youve completed the sign-up process. If you do not sign up before the expiration date, you must request a new code. · Light Blue Optics Access Code: [email or web address] Expires: 5/17/2018 11:55 AM 
 
4. Enter the last four digits of your Social Security Number (xxxx) and Date of Birth (mm/dd/yyyy) as indicated and click Submit. You will be taken to the next sign-up page. 5. Create a Light Blue Optics ID. This will be your [employer]ics login ID and cannot be changed, so think of one that is secure and easy to remember. 6. Create a [employer] password. You can change your password at any time. 7. Enter your Password Reset Question and Answer. This can be used at a later time if you forget your password. 8. Enter your e-mail address. You will receive e-mail notification when new information is available in [address] 9. Click Sign Up. You can now view and download portions of your medical record. 10. Click the Download Summary menu link to download a portable copy of your medical information. If you have questions, please visit the Frequently Asked Questions section of the [employer] website. Remember, [employer]ics is NOT to be used for urgent needs. For medical emergencies, dial 911. Now available from your iPhone and Android! Please provide this summary of care documentation to your next provider. Your primary care clinician is listed as LAUREN Zamora 21. If you have any questions after today's visit, please call [phone].

## 2018-03-22 NOTE — PROGRESS NOTES
Soledad Terrazas is a 57 y.o. male presenting for Follow-up  . 1. Have you been to the ER, urgent care clinic since your last visit? Hospitalized since your last visit? No    2. Have you seen or consulted any other health care providers outside of the [address] [place] [address] since your last visit? Include any pap smears or colon screening. No    No flowsheet data found. Abuse Screening Questionnaire 8/10/2017   Do you ever feel afraid of your partner? N   Are you in a relationship with someone who physically or mentally threatens you? N   Is it safe for you to go home? Y       PHQ over the last two weeks 8/10/2017   Little interest or pleasure in doing things Not at all   Feeling down, depressed or hopeless Not at all   Total Score PHQ 2 0       There are no discontinued medications.

## 2018-03-22 NOTE — PATIENT INSTRUCTIONS
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
## 2018-03-29 ENCOUNTER — HOSPITAL ENCOUNTER (OUTPATIENT)
Dept: NON INVASIVE DIAGNOSTICS | Age: 63
Discharge: HOME | End: 2018-03-29
Attending: INTERNAL MEDICINE
Payer: COMMERCIAL

## 2018-03-29 VITALS
OXYGEN SATURATION: 95 % | BODY MASS INDEX: 30.34 KG/M2 | HEART RATE: 62 BPM | HEIGHT: 72 IN | DIASTOLIC BLOOD PRESSURE: 73 MMHG | SYSTOLIC BLOOD PRESSURE: 132 MMHG | RESPIRATION RATE: 16 BRPM | WEIGHT: 224 LBS

## 2018-03-29 DIAGNOSIS — I48.91 ATRIAL FIBRILLATION (HCC): ICD-10-CM

## 2018-03-29 PROCEDURE — 74011000250 HC RX REV CODE- 250: Performed by: INTERNAL MEDICINE

## 2018-03-29 PROCEDURE — 74011000250 HC RX REV CODE- 250

## 2018-03-29 PROCEDURE — 74011250636 HC RX REV CODE- 250/636

## 2018-03-29 PROCEDURE — 99152 MOD SED SAME PHYS/QHP 5/>YRS: CPT

## 2018-03-29 PROCEDURE — 93312 ECHO TRANSESOPHAGEAL: CPT

## 2018-03-29 RX ORDER — MIDAZOLAM HYDROCHLORIDE 1 MG/ML
INJECTION, SOLUTION INTRAMUSCULAR; INTRAVENOUS
Status: COMPLETED
Start: 2018-03-29 | End: 2018-03-29

## 2018-03-29 RX ORDER — FENTANYL CITRATE 50 UG/ML
25-50 INJECTION, SOLUTION INTRAMUSCULAR; INTRAVENOUS
Status: DISCONTINUED | OUTPATIENT
Start: 2018-03-29 | End: 2018-03-29

## 2018-03-29 RX ORDER — FENTANYL CITRATE 50 UG/ML
INJECTION, SOLUTION INTRAMUSCULAR; INTRAVENOUS
Status: COMPLETED
Start: 2018-03-29 | End: 2018-03-29

## 2018-03-29 RX ORDER — MIDAZOLAM HYDROCHLORIDE 1 MG/ML
.5-2 INJECTION, SOLUTION INTRAMUSCULAR; INTRAVENOUS
Status: DISCONTINUED | OUTPATIENT
Start: 2018-03-29 | End: 2018-03-29

## 2018-03-29 RX ORDER — LIDOCAINE HYDROCHLORIDE 20 MG/ML
SOLUTION OROPHARYNGEAL
Status: COMPLETED
Start: 2018-03-29 | End: 2018-03-29

## 2018-03-29 RX ORDER — LIDOCAINE HYDROCHLORIDE 20 MG/ML
15 SOLUTION OROPHARYNGEAL ONCE
Status: COMPLETED | OUTPATIENT
Start: 2018-03-29 | End: 2018-03-29

## 2018-03-29 RX ADMIN — FENTANYL CITRATE 50 MCG: 50 INJECTION, SOLUTION INTRAMUSCULAR; INTRAVENOUS at 13:28

## 2018-03-29 RX ADMIN — FENTANYL CITRATE 50 MCG: 50 INJECTION, SOLUTION INTRAMUSCULAR; INTRAVENOUS at 13:39

## 2018-03-29 RX ADMIN — MIDAZOLAM HYDROCHLORIDE 2 MG: 1 INJECTION, SOLUTION INTRAMUSCULAR; INTRAVENOUS at 13:29

## 2018-03-29 RX ADMIN — LIDOCAINE HYDROCHLORIDE 15 ML: 20 SOLUTION OROPHARYNGEAL at 13:30

## 2018-03-29 RX ADMIN — BENZOCAINE, BUTAMBEN, AND TETRACAINE HYDROCHLORIDE 1 SPRAY: .028; .004; .004 AEROSOL, SPRAY TOPICAL at 13:31

## 2018-03-29 RX ADMIN — LIDOCAINE HYDROCHLORIDE 15 ML: 20 SOLUTION ORAL; TOPICAL at 13:30

## 2018-03-29 RX ADMIN — MIDAZOLAM HYDROCHLORIDE 1 MG: 1 INJECTION, SOLUTION INTRAMUSCULAR; INTRAVENOUS at 13:33

## 2018-03-29 RX ADMIN — MIDAZOLAM HYDROCHLORIDE 1 MG: 1 INJECTION, SOLUTION INTRAMUSCULAR; INTRAVENOUS at 13:40

## 2018-03-29 NOTE — IP AVS SNAPSHOT
[address] [facility] 83. 
[phone] Patient: Raúl Dutta MRN: [id number] AUZ:2/58/8151 About your hospitalization You were admitted on:  March 29, 2018 You last received care in the:  \[facility] [place] [facility]\"" NON-INVASIVE CARD You were discharged on:  March 29, 2018 Why you were hospitalized Your primary diagnosis was:  Not on File Follow-up Information Follow up With Details Comments Contact Info MD Rob Back [address] [facility] 83. 
[phone] Your Scheduled Appointments Thursday April 19, 2018 11:00 AM EDT FOLLOW UP 10 with David Najera MD  
[facility] 26 ([address] [facility]) Rob Don [address] [id number] [phone] Discharge Orders None A check rodri indicates which time of day the medication should be taken. My Medications CONTINUE taking these medications Instructions Each Dose to Equal  
 Morning Noon Evening Bedtime Blood-Glucose Meter monitoring kit Commonly known as:  Micheal Cruz Your last dose was: Your next dose is: For once daily blood glucose checks  
     
   
   
   
  
 canagliflozin 300 mg tablet Commonly known as:  Sj Betancur Your last dose was: Your next dose is: Take 1 Tab by mouth Daily (before breakfast). 300 mg  
    
   
   
   
  
 dilTIAZem 360 mg ER capsule Commonly known as:  [facility] Your last dose was: Your next dose is: Take 1 Cap by mouth daily. 360 mg  
    
   
   
   
  
 glipiZIDE 10 mg tablet Commonly known as:  Cheikh Saleh Your last dose was: Your next dose is: Take 2 Tabs by mouth two (2) times a day. Indications: 2 tabs 2 times daily  20 mg  
    
   
   
   
  
 glucose blood VI test strips strip Commonly known as:  ONETOUCH ULTRA TEST Your last dose was: Your next dose is:    
   
   
 Check blood glucose once daily Discharge Instructions Cardiology Discharge Instructions Patient ID: Evelin Grubbs [id number] 
7 y.o. 
1955 Admit Date: 3/29/2018    Discharge Date: 3/29/2018 Physician: Destini Pineda MD 
 
Cardiology Procedures this Admission: 1.  OBDULIO to evaluate for Watchman device candidacy Disposition: home with a  Patient Instructions: Will review the echo anatomy. We should meet after you've had a chance to review the website [email or web address] Systems. For today, no driving, operating heavy machinery, or signing legal documents. No eating or drinking HOT things for 4 hours after your procedure. Warm or cool is OK. FOLLOW-UP: 
1. Call the office [phone] to make an appointment for one month with me. 
 
[address] [facility] 700    [phone] [facility] [address] [facility]    [email or web address] Thank you for placing your trust for your heart with the physicians and staff of [facility]. We have long provided [place] with the most advanced cardiovascular care possible using a personalized and caring approach. And we hope to continue this strong tradition well into the future. A heart condition, or the fear of having a heart condition, can be a stressful time for a patient and their family. There are appointments, testing procedures and unfamiliar terms that can cause natural questions and concerns. While we encourage you to speak with your nurse or physician regarding any questions you might have, please consider this web site as a powerful resource you can use at any time. Often, questions or concerns only arise once you've left our office.  There are many useful sections on this web site and links to other professional organizations and advocacy groups. These sources can help answer many questions you might have from the comfort of your own home or office. Again, thank you for considering [facility] as your trusted provider of heart care. Please don't hesitate to let us know if there is anything we can do to enhance your experience with us. Signed: 
Edwar Alan MD 
3/29/2018 Introducing [facility] & HEALTH SERVICES! [employer] introduces [employer] patient portal. Now you can access parts of your medical record, email your doctor's office, and request medication refills online. 1. In your internet browser, go to [email or web address] 2. Click on the First Time User? Click Here link in the Sign In box. You will see the New Member Sign Up page. 3. Enter your J[employer] Access Code exactly as it appears below. You will not need to use this code after youve completed the sign-up process. If you do not sign up before the expiration date, you must request a new code. · [employer] Access Code: [email or web address] Expires: 5/17/2018 11:55 AM 
 
4. Enter the last four digits of your Social Security Number (xxxx) and Date of Birth (mm/dd/yyyy) as indicated and click Submit. You will be taken to the next sign-up page. 5. Create a J[employer] Games ID. This will be your [employer] login ID and cannot be changed, so think of one that is secure and easy to remember. 6. Create a J[employer] password. You can change your password at any time. 7. Enter your Password Reset Question and Answer. This can be used at a later time if you forget your password. 8. Enter your e-mail address. You will receive e-mail notification when new information is available in [address] 9. Click Sign Up. You can now view and download portions of your medical record. 10. Click the Download Summary menu link to download a portable copy of your medical information.  
 
If you have questions, please visit the Frequently Asked Questions section of the Playbasis. Remember, MyChart is NOT to be used for urgent needs. For medical emergencies, dial 911. Now available from your iPhone and Android! Introducing Guanakito Pierre As a [facility] patient, I wanted to make you aware of our electronic visit tool called Guanakito Pierre. [facility]/Reesio allows you to connect within minutes with a medical provider 24 hours a day, seven days a week via a mobile device or tablet or logging into a secure website from your computer. You can access Guanakito Pierre from anywhere in the [place]. A virtual visit might be right for you when you have a simple condition and feel like you just dont want to get out of bed, or cant get away from work for an appointment, when your regular [facility] provider is not available (evenings, weekends or holidays), or when youre out of town and need minor care. Electronic visits cost only $49 and if the [facility]/Reesio provider determines a prescription is needed to treat your condition, one can be electronically transmitted to a nearby pharmacy*. Please take a moment to enroll today if you have not already done so. The enrollment process is free and takes just a few minutes. To enroll, please download the [email or web address] epifanio to your tablet or phone, or visit [email or web address] to enroll on your computer. And, as an [address] [place] [address] patient with a Ozmosis account, the results of your visits will be scanned into your electronic medical record and your primary care provider will be able to view the scanned results. We urge you to continue to see your regular [facility] provider for your ongoing medical care. And while your primary care provider may not be the one available when you seek a Guanakito Pierre virtual visit, the peace of mind you get from getting a real diagnosis real time can be priceless. For more information on Guanakito Pierre, view our Frequently Asked Questions (FAQs) at [email or web address]. Sincerely, 
 
Abebe Frye MD 
Chief Medical Officer [employer] *:  certain medications cannot be prescribed via Guanakito Pierre Providers Seen During Your Hospitalization Provider Specialty Primary office phone Kp Ha MD Cardiology [phone] Your Primary Care Physician (PCP) Primary Care Physician Office Phone Office Fax Yaneli Mohan [phone] You are allergic to the following Allergen Reactions Demerol (Meperidine) Hives Pcn (Penicillins) Hives Recent Documentation Height Weight BMI Smoking Status 1.829 m 101.6 kg 30.38 kg/m2 Never Smoker Emergency Contacts Name Discharge Info Relation Home Work Mobile [address] CAREGIVER [3] Spouse [3] [phone] Patient Belongings The following personal items are in your possession at time of discharge: 
                             
 
  
  
 Please provide this summary of care documentation to your next provider. Signatures-by signing, you are acknowledging that this After Visit Summary has been reviewed with you and you have received a copy. Patient Signature:  ____________________________________________________________ Date:  ____________________________________________________________  
  
Alhaji Polo Provider Signature:  ____________________________________________________________ Date:  ____________________________________________________________

## 2018-03-29 NOTE — PROGRESS NOTES
Pt sedated with 100 mcg IV Fentanyl and 4 mg IV Versed for OBDULIO. Pt tolerated well.  Monitored sedation time 13:28 to 13:48

## 2018-03-29 NOTE — DISCHARGE INSTRUCTIONS
Cardiology Discharge Instructions                Patient ID:  Roxie Obrien  [id number]  1 y.o.  1955    Admit Date: 3/29/2018    Discharge Date: 3/29/2018   Physician: Kp Ha MD    Cardiology Procedures this Admission:  1.  OBDULIO to evaluate for Watchman device candidacy    Disposition: home with a     Patient Instructions: Will review the echo anatomy. We should meet after you've had a chance to review the website [email or web address]. For today, no driving, operating heavy machinery, or signing legal documents. No eating or drinking HOT things for 4 hours after your procedure. Warm or cool is OK. FOLLOW-UP:  1. Call the office [phone] to make an appointment for one month with me.    [address]    [phone]  [place], [address] [facility]    [email or web address]    Thank you for placing your trust for your heart with the physicians and staff of [facility]. We have long provided [place] with the most advanced cardiovascular care possible using a personalized and caring approach. And we hope to continue this strong tradition well into the future. A heart condition, or the fear of having a heart condition, can be a stressful time for a patient and their family. There are appointments, testing procedures and unfamiliar terms that can cause natural questions and concerns. While we encourage you to speak with your nurse or physician regarding any questions you might have, please consider this web site as a powerful resource you can use at any time. Often, questions or concerns only arise once you've left our office. There are many useful sections on this web site and links to other professional organizations and advocacy groups. These sources can help answer many questions you might have from the comfort of your own home or office. Again, thank you for considering [facility] as your trusted provider of heart care.  Please don't hesitate to let us know if there is anything we can do to enhance your experience with us.      Signed:  Chirag Adam MD  3/29/2018

## 2018-03-29 NOTE — H&P
[facility]  ACUTE CARE HISTORY AND PHYSICAL    Era Trenton.  MR#: [id number]  : 1955  ACCOUNT #: [de-identified]   DATE OF SERVICE: 2018    HISTORY OF PRESENT ILLNESS:  This is a 70-year-old male with a history of hypertension, hyperlipidemia, and paroxysmal atrial fibrillation as well as atrial flutter. He has a CHADS2 score of 2 (hypertension and diabetes), had been on Eliquis, but this was stopped secondary to gastrointestinal bleeding. He does have a history of a remote nonischemic cardiomyopathy with alcohol abuse. He quit drinking significant alcohol many years ago. Additional comorbidities include diabetes mellitus, chronic back pain, and orthostatic hypotension. His gastrointestinal evaluation did not reveal a clear source of bleeding. He did receive transfusion from this bleed. No recurrence. ALLERGIES:  1. MEPERIDINE. 2.  PENICILLIN. PAST MEDICAL HISTORY:  As above. FAMILY HISTORY:  Noncontributory. SOCIAL HISTORY:  Noncontributory. REVIEW OF SYSTEMS:  Noncontributory. PHYSICAL EXAMINATION:  VITAL SIGNS:  Pulse 63, respiratory rate 16, oxygen saturation 94%, weight 101.6 kilograms. GENERAL:  Not diaphoretic, not acute distress. CARDIOVASCULAR:  Regular rate and rhythm, no murmur. NEUROLOGIC:  Awake, appropriate, grossly nonfocal, no resting tremor. TELEMETRY:  Sinus rhythm. ASSESSMENT:  1. Paroxysmal atrial fibrillation with a high enough thromboembolic risk to offer anticoagulation. He has been on Eliquis in the past, but this was stopped due to gastrointestinal bleeding. No source was identified. He is here today to have a transesophageal echo to see if he may be a candidate for a Watchman procedure. RECOMMENDATIONS:  Further evaluation and treatment pending his course. MD ARTURO King / [place]  D: 2018 14:20     T: 2018 14:32  JOB #: [id number]

## 2018-03-29 NOTE — PROCEDURES
[address]  [phone]    Patient ID:  Patient: Joaquin Gupta  MRN: [id number]  Age: 57 y.o.  : 1955  Gender: male  Study Date: 3/29/2018    History: This is a male with a history of paroxysmal atrial fibrillation.  He is here to screen for intracardiac thrombus and left atrial appendage anatomy as a possible Watchman candidate.      PROCEDURE:  Sedation was administered by the nurse who was in constant attendance and my supervision throughout the procedure. Versed and fentanyl were used, sedation time 13:28-13:48 (20 minutes).  The study included complete 2D imaging, M-mode, complete spectral Doppler, and color Doppler.  The echo probe was passed easily into the esophagus and images taken.  The quality of the images was satisfactory.  At the end of the procedure, the probe was removed without issue. There were no complications during the procedure.  Sinus rhythm was present during the procedure.      FINDINGS:  LEFT VENTRICLE: Size was normal. Systolic function was normal with an ejection fraction estimated to be 60-65%.  Wall thickness was mildly increased. RIGHT VENTRICLE: The size was normal. Systolic function was normal. Wall thickness was normal.    LEFT ATRIUM:  Size was mildly-dilated.  No intracavitary thrombus or mass was identified.  APPENDAGE:  Scanned comprehensively. Multilobar.  No thrombus noted. ATRIAL SEPTUM:  No evidence a septal defect or PFO or aneurysm was present with color flow doppler. No evidence of shunt with agitated saline contrast.    RIGHT ATRIUM:  Size was mildly-dilated.  No intracavitary thrombus or mass was identified. MITRAL VALVE:  Normal valve structure. There was normal leaflet separation.  DOPPLER: There was mild nonrheumatic regurgitation.     AORTIC VALVE: The aortic valve was trileaflet.  Leaflets exhibited sclerosis but normal cuspal separation.  No mass, vegetation or thrombus noted. DOPPLER:  No regurgitation.      PULMONIC VALVE:  Poorly-visualized, no obvious abnormality. TRICUSPID VALVE: Normal valve structure. There was normal leaflet separation. No obvious mass, vegetation or thrombus noted. DOPPLER: There was trace nonrheumatic regurgitation.      AORTA: Normal aortic root.  No dissection or aneurysm in visualized portions of the aorta.  There was mild nonmobile atherosclerotic plaque.      PERICARDIUM:  No hemodynamically significant pericardial effusion.          SUMMARY:  1.  Normal LV systolic function, EF 54-40%. 2.  Mild mitral valve regurgitation, nonrheumatic. 3.  No intracardiac thrombus. 4.  No shunt.          Preoperative Diagnosis: As above. Postoperative Diagnosis: As above. Procedure: As above. Surgeon(s) and Role: Ellie Estrada MD - Primary    Anesthesia: [facility]. Estimated Blood Loss: None. Specimens: * No specimens in log *    Findings: As above.   Complications: None.          Signed:   Ellie Estrada MD

## 2018-03-29 NOTE — PROGRESS NOTES
Discharge instructions reviewed with patient and wife, Yanique Bernard. Allowed adequate time to ask questions, all questions answered. Printed copy of AVS given to patient. All belongings gathered, IV and tele discontinued. Transported via wheelchair to main entrance and into care of wife, Yanique Bernard.

## 2018-04-02 RX ORDER — CANAGLIFLOZIN 300 MG/1
TABLET, FILM COATED ORAL
Qty: 30 TAB | Refills: 1 | Status: SHIPPED | OUTPATIENT
Start: 2018-04-02 | End: 2018-04-26 | Stop reason: ALTCHOICE

## 2018-04-26 ENCOUNTER — OFFICE VISIT (OUTPATIENT)
Dept: INTERNAL MEDICINE CLINIC | Age: 63
End: 2018-04-26

## 2018-04-26 VITALS
DIASTOLIC BLOOD PRESSURE: 90 MMHG | OXYGEN SATURATION: 93 % | HEIGHT: 72 IN | WEIGHT: 220 LBS | HEART RATE: 75 BPM | BODY MASS INDEX: 29.8 KG/M2 | SYSTOLIC BLOOD PRESSURE: 136 MMHG

## 2018-04-26 DIAGNOSIS — I10 ESSENTIAL HYPERTENSION: Primary | ICD-10-CM

## 2018-04-26 RX ORDER — DILTIAZEM HYDROCHLORIDE 360 MG/1
360 CAPSULE, EXTENDED RELEASE ORAL DAILY
Qty: 30 CAP | Refills: 3 | Status: SHIPPED | OUTPATIENT
Start: 2018-04-26 | End: 2018-09-23 | Stop reason: SDUPTHER

## 2018-04-26 NOTE — PROGRESS NOTES
Jose A Paul is a 57 y.o. male presenting for Follow-up (4 week fu)  . 1. Have you been to the ER, urgent care clinic since your last visit? Hospitalized since your last visit? No    2. Have you seen or consulted any other health care providers outside of the [facility] since your last visit? Include any pap smears or colon screening. No    No flowsheet data found. Abuse Screening Questionnaire 8/10/2017   Do you ever feel afraid of your partner? N   Are you in a relationship with someone who physically or mentally threatens you? N   Is it safe for you to go home? Y       PHQ over the last two weeks 8/10/2017   Little interest or pleasure in doing things Not at all   Feeling down, depressed or hopeless Not at all   Total Score PHQ 2 0       There are no discontinued medications.                     3
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
## 2018-04-26 NOTE — PATIENT INSTRUCTIONS

## 2018-05-14 NOTE — TELEPHONE ENCOUNTER
Last visit:4/26/2018      Requested Prescriptions     Pending Prescriptions Disp Refills    empagliflozin (JARDIANCE) 25 mg tablet 30 Tab 3     Sig: Take 1 Tab by mouth daily.

## 2018-08-06 RX ORDER — GLIPIZIDE 10 MG/1
TABLET ORAL
Qty: 120 TAB | Refills: 3 | Status: SHIPPED | OUTPATIENT
Start: 2018-08-06 | End: 2018-10-29 | Stop reason: SDUPTHER

## 2018-09-15 RX ORDER — EMPAGLIFLOZIN 25 MG/1
TABLET, FILM COATED ORAL
Qty: 30 TAB | Refills: 0 | Status: SHIPPED | OUTPATIENT
Start: 2018-09-15 | End: 2018-10-14 | Stop reason: SDUPTHER

## 2018-09-23 DIAGNOSIS — I10 ESSENTIAL HYPERTENSION: ICD-10-CM

## 2018-09-23 RX ORDER — DILTIAZEM HYDROCHLORIDE 360 MG/1
CAPSULE, EXTENDED RELEASE ORAL
Qty: 90 CAP | Refills: 0 | Status: SHIPPED | OUTPATIENT
Start: 2018-09-23 | End: 2019-04-04 | Stop reason: SDUPTHER

## 2018-09-23 RX ORDER — DILTIAZEM HYDROCHLORIDE 360 MG/1
CAPSULE, EXTENDED RELEASE ORAL
Qty: 30 CAP | Refills: 0 | Status: SHIPPED | OUTPATIENT
Start: 2018-09-23 | End: 2018-09-23 | Stop reason: SDUPTHER

## 2018-10-14 RX ORDER — EMPAGLIFLOZIN 25 MG/1
TABLET, FILM COATED ORAL
Qty: 30 TAB | Refills: 0 | Status: SHIPPED | OUTPATIENT
Start: 2018-10-14 | End: 2018-11-14

## 2018-10-29 RX ORDER — GLIPIZIDE 10 MG/1
TABLET ORAL
Qty: 360 TAB | Refills: 0 | Status: SHIPPED | OUTPATIENT
Start: 2018-10-29 | End: 2019-01-26 | Stop reason: SDUPTHER

## 2018-11-14 ENCOUNTER — OFFICE VISIT (OUTPATIENT)
Dept: INTERNAL MEDICINE CLINIC | Age: 63
End: 2018-11-14

## 2018-11-14 VITALS
BODY MASS INDEX: 29.36 KG/M2 | HEIGHT: 72 IN | DIASTOLIC BLOOD PRESSURE: 80 MMHG | WEIGHT: 216.8 LBS | HEART RATE: 70 BPM | OXYGEN SATURATION: 97 % | SYSTOLIC BLOOD PRESSURE: 138 MMHG

## 2018-11-14 DIAGNOSIS — I10 ESSENTIAL HYPERTENSION: ICD-10-CM

## 2018-11-14 DIAGNOSIS — L97.309 DIABETIC ANKLE ULCER (HCC): ICD-10-CM

## 2018-11-14 DIAGNOSIS — Z23 ENCOUNTER FOR IMMUNIZATION: ICD-10-CM

## 2018-11-14 DIAGNOSIS — E11.319 TYPE 2 DIABETES MELLITUS WITH RETINOPATHY OF BOTH EYES, WITHOUT LONG-TERM CURRENT USE OF INSULIN, MACULAR EDEMA PRESENCE UNSPECIFIED, UNSPECIFIED RETINOPATHY SEVERITY (HCC): ICD-10-CM

## 2018-11-14 DIAGNOSIS — Z01.818 PREOPERATIVE CLEARANCE: Primary | ICD-10-CM

## 2018-11-14 DIAGNOSIS — E11.622 DIABETIC ANKLE ULCER (HCC): ICD-10-CM

## 2018-11-14 DIAGNOSIS — I48.0 PAROXYSMAL ATRIAL FIBRILLATION (HCC): ICD-10-CM

## 2018-11-14 RX ORDER — OMEPRAZOLE 20 MG/1
20 TABLET, DELAYED RELEASE ORAL DAILY
COMMUNITY

## 2018-11-14 NOTE — PROGRESS NOTES
Yasmany Batres is a 70 y.o. male presenting for Pre-op Exam  .     1. Have you been to the ER, urgent care clinic since your last visit? Hospitalized since your last visit? No    2. Have you seen or consulted any other health care providers outside of the [address] [place] [address] since your last visit? Include any pap smears or colon screening. Dr Edd Beck, last 12 mths 11/14/2018   Able to walk? Yes   Fall in past 12 months? No         Abuse Screening Questionnaire 8/10/2017   Do you ever feel afraid of your partner? N   Are you in a relationship with someone who physically or mentally threatens you? N   Is it safe for you to go home?  Y       PHQ over the last two weeks 11/14/2018   Little interest or pleasure in doing things Not at all   Feeling down, depressed, irritable, or hopeless Not at all   Total Score PHQ 2 0       Medications Discontinued During This Encounter   Medication Reason    JARDIANCE 25 mg tablet     empagliflozin (JARDIANCE) 25 mg tablet

## 2018-11-14 NOTE — PATIENT INSTRUCTIONS
Learning About Diabetes Food Guidelines  Your Care Instructions    Meal planning is important to manage diabetes. It helps keep your blood sugar at a target level (which you set with your doctor). You don't have to eat special foods. You can eat what your family eats, including sweets once in a while. But you do have to pay attention to how often you eat and how much you eat of certain foods. You may want to work with a dietitian or a certified diabetes educator (CDE) to help you plan meals and snacks. A dietitian or CDE can also help you lose weight if that is one of your goals. What should you know about eating carbs? Managing the amount of carbohydrate (carbs) you eat is an important part of healthy meals when you have diabetes. Carbohydrate is found in many foods. · Learn which foods have carbs. And learn the amounts of carbs in different foods. ? Bread, cereal, pasta, and rice have about 15 grams of carbs in a serving. A serving is 1 slice of bread (1 ounce), ½ cup of cooked cereal, or 1/3 cup of cooked pasta or rice. ? Fruits have 15 grams of carbs in a serving. A serving is 1 small fresh fruit, such as an apple or orange; ½ of a banana; ½ cup of cooked or canned fruit; ½ cup of fruit juice; 1 cup of melon or raspberries; or 2 tablespoons of dried fruit. ? Milk and no-sugar-added yogurt have 15 grams of carbs in a serving. A serving is 1 cup of milk or 2/3 cup of no-sugar-added yogurt. ? Starchy vegetables have 15 grams of carbs in a serving. A serving is ½ cup of mashed potatoes or sweet potato; 1 cup winter squash; ½ of a small baked potato; ½ cup of cooked beans; or ½ cup cooked corn or green peas. · Learn how much carbs to eat each day and at each meal. A dietitian or CDE can teach you how to keep track of the amount of carbs you eat. This is called carbohydrate counting. · If you are not sure how to count carbohydrate grams, use the Plate Method to plan meals.  It is a good, quick way to make sure that you have a balanced meal. It also helps you spread carbs throughout the day. ? Divide your plate by types of foods. Put non-starchy vegetables on half the plate, meat or other protein food on one-quarter of the plate, and a grain or starchy vegetable in the final quarter of the plate. To this you can add a small piece of fruit and 1 cup of milk or yogurt, depending on how many carbs you are supposed to eat at a meal.  · Try to eat about the same amount of carbs at each meal. Do not \"save up\" your daily allowance of carbs to eat at one meal.  · Proteins have very little or no carbs per serving. Examples of proteins are beef, chicken, turkey, fish, eggs, tofu, cheese, cottage cheese, and peanut butter. A serving size of meat is 3 ounces, which is about the size of a deck of cards. Examples of meat substitute serving sizes (equal to 1 ounce of meat) are 1/4 cup of cottage cheese, 1 egg, 1 tablespoon of peanut butter, and ½ cup of tofu. How can you eat out and still eat healthy? · Learn to estimate the serving sizes of foods that have carbohydrate. If you measure food at home, it will be easier to estimate the amount in a serving of restaurant food. · If the meal you order has too much carbohydrate (such as potatoes, corn, or baked beans), ask to have a low-carbohydrate food instead. Ask for a salad or green vegetables. · If you use insulin, check your blood sugar before and after eating out to help you plan how much to eat in the future. · If you eat more carbohydrate at a meal than you had planned, take a walk or do other exercise. This will help lower your blood sugar. What else should you know? · Limit saturated fat, such as the fat from meat and dairy products. This is a healthy choice because people who have diabetes are at higher risk of heart disease. So choose lean cuts of meat and nonfat or low-fat dairy products.  Use olive or canola oil instead of butter or shortening when cooking. · Don't skip meals. Your blood sugar may drop too low if you skip meals and take insulin or certain medicines for diabetes. · Check with your doctor before you drink alcohol. Alcohol can cause your blood sugar to drop too low. Alcohol can also cause a bad reaction if you take certain diabetes medicines. Follow-up care is a key part of your treatment and safety. Be sure to make and go to all appointments, and call your doctor if you are having problems. It's also a good idea to know your test results and keep a list of the medicines you take. Where can you learn more? Go to http://[employer].info/. Enter Z206 in the search box to learn more about \"Learning About Diabetes Food Guidelines. \"  Current as of: December 7, 2017  Content Version: 11.8  © 0023-0009 [employer]. Care instructions adapted under license by [employer] (which disclaims liability or warranty for this information). If you have questions about a medical condition or this instruction, always ask your healthcare professional. [place] [address] any warranty or liability for your use of this information. Vaccine Information Statement    Influenza (Flu) Vaccine (Inactivated or Recombinant): What you need to know    Many Vaccine Information Statements are available in [place] and other languages. See www.immunize.org/vis  Hojas de Información Sobre Vacunas están disponibles en Español y en muchos otros idiomas. Visite www.immunize.org/vis    1. Why get vaccinated? Influenza (flu) is a contagious disease that spreads around the [place] every year, usually between October and May. Flu is caused by influenza viruses, and is spread mainly by coughing, sneezing, and close contact. Anyone can get flu. Flu strikes suddenly and can last several days.  Symptoms vary by age, but can include:   fever/chills   sore throat   muscle aches   fatigue   cough   headache    runny or stuffy nose    Flu can also lead to pneumonia and blood infections, and cause diarrhea and seizures in children. If you have a medical condition, such as heart or lung disease, flu can make it worse. Flu is more dangerous for some people. Infants and young children, people 72years of age and older, pregnant women, and people with certain health conditions or a weakened immune system are at greatest risk. Each year thousands of people in the [facility] die from flu, and many more are hospitalized. Flu vaccine can:   keep you from getting flu,   make flu less severe if you do get it, and   keep you from spreading flu to your family and other people. 2. Inactivated and recombinant flu vaccines    A dose of flu vaccine is recommended every flu season. Children 6 months through 6years of age may need two doses during the same flu season. Everyone else needs only one dose each flu season. Some inactivated flu vaccines contain a very small amount of a mercury-based preservative called thimerosal. Studies have not shown thimerosal in vaccines to be harmful, but flu vaccines that do not contain thimerosal are available. There is no live flu virus in flu shots. They cannot cause the flu. There are many flu viruses, and they are always changing. Each year a new flu vaccine is made to protect against three or four viruses that are likely to cause disease in the upcoming flu season. But even when the vaccine doesnt exactly match these viruses, it may still provide some protection    Flu vaccine cannot prevent:   flu that is caused by a virus not covered by the vaccine, or   illnesses that look like flu but are not. It takes about 2 weeks for protection to develop after vaccination, and protection lasts through the flu season.      3. Some people should not get this vaccine    Tell the person who is giving you the vaccine:     If you have any severe, life-threatening allergies. If you ever had a life-threatening allergic reaction after a dose of flu vaccine, or have a severe allergy to any part of this vaccine, you may be advised not to get vaccinated. Most, but not all, types of flu vaccine contain a small amount of egg protein.  If you ever had Guillain-Barré Syndrome (also called GBS). Some people with a history of GBS should not get this vaccine. This should be discussed with your doctor.  If you are not feeling well. It is usually okay to get flu vaccine when you have a mild illness, but you might be asked to come back when you feel better. 4. Risks of a vaccine reaction    With any medicine, including vaccines, there is a chance of reactions. These are usually mild and go away on their own, but serious reactions are also possible. Most people who get a flu shot do not have any problems with it. Minor problems following a flu shot include:    soreness, redness, or swelling where the shot was given     hoarseness   sore, red or itchy eyes   cough   fever   aches   headache   itching   fatigue  If these problems occur, they usually begin soon after the shot and last 1 or 2 days. More serious problems following a flu shot can include the following:     There may be a small increased risk of Guillain-Barré Syndrome (GBS) after inactivated flu vaccine. This risk has been estimated at 1 or 2 additional cases per million people vaccinated. This is much lower than the risk of severe complications from flu, which can be prevented by flu vaccine.  Young children who get the flu shot along with pneumococcal vaccine (PCV13) and/or DTaP vaccine at the same time might be slightly more likely to have a seizure caused by fever. Ask your doctor for more information. Tell your doctor if a child who is getting flu vaccine has ever had a seizure.      Problems that could happen after any injected vaccine:  People sometimes faint after a medical procedure, including vaccination. Sitting or lying down for about 15 minutes can help prevent fainting, and injuries caused by a fall. Tell your doctor if you feel dizzy, or have vision changes or ringing in the ears.  Some people get severe pain in the shoulder and have difficulty moving the arm where a shot was given. This happens very rarely.  Any medication can cause a severe allergic reaction. Such reactions from a vaccine are very rare, estimated at about 1 in a million doses, and would happen within a few minutes to a few hours after the vaccination. As with any medicine, there is a very remote chance of a vaccine causing a serious injury or death. The safety of vaccines is always being monitored. For more information, visit: www.cdc.gov/vaccinesafety/    5. What if there is a serious reaction? What should I look for?  Look for anything that concerns you, such as signs of a severe allergic reaction, very high fever, or unusual behavior. Signs of a severe allergic reaction can include hives, swelling of the face and throat, difficulty breathing, a fast heartbeat, dizziness, and weakness - usually within a few minutes to a few hours after the vaccination. What should I do?  If you think it is a severe allergic reaction or other emergency that cant wait, call 9-1-1 and get the person to the nearest hospital. Otherwise, call your doctor.  Reactions should be reported to the Vaccine Adverse Event Reporting System (VAERS). Your doctor should file this report, or you can do it yourself through  the VAERS web site at www.vaers. hhs.gov, or by calling 2-779.258.5882. VAERS does not give medical advice. 6. The National Vaccine Injury Compensation Program    The [facility] Vaccine Injury Compensation Program (VICP) is a federal program that was created to compensate people who may have been injured by certain vaccines.     Persons who believe they may have been injured by a vaccine can learn about the program and about filing a claim by calling 3-484.948.9932 or visiting the [facility] [employer] website at [email or web address]/vaccinecompensation. There is a time limit to file a claim for compensation. 7. How can I learn more?  Ask your healthcare provider. He or she can give you the vaccine package insert or suggest other sources of information.  Call your local or state health department.  Contact the Centers for Disease Control and Prevention (CDC):  - Call 2-247.741.8133 (1-800-CDC-INFO) or  - Visit CDCs website at www.cdc.gov/flu    Vaccine Information Statement   Inactivated Influenza Vaccine   8/7/2015  42 STACY Trevizo [id number]    Department of Health and Human Services  Centers for Disease Control and Prevention    Office Use Only
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
## 2018-11-15 NOTE — TELEPHONE ENCOUNTER
Pharmacy on file verified  Requested Prescriptions     Pending Prescriptions Disp Refills    empagliflozin (JARDIANCE) 25 mg tablet 30 Tab 3     Sig: Take 1 Tab by mouth daily.      LOV 11/14/2018

## 2018-12-21 DIAGNOSIS — I10 ESSENTIAL HYPERTENSION: ICD-10-CM

## 2018-12-21 RX ORDER — DILTIAZEM HYDROCHLORIDE 360 MG/1
CAPSULE, EXTENDED RELEASE ORAL
Qty: 90 CAP | Refills: 0 | Status: SHIPPED | OUTPATIENT
Start: 2018-12-21 | End: 2019-03-29 | Stop reason: SDUPTHER

## 2019-01-26 RX ORDER — GLIPIZIDE 10 MG/1
TABLET ORAL
Qty: 360 TAB | Refills: 0 | Status: SHIPPED | OUTPATIENT
Start: 2019-01-26 | End: 2019-05-13 | Stop reason: SDUPTHER

## 2019-03-28 RX ORDER — EMPAGLIFLOZIN 25 MG/1
TABLET, FILM COATED ORAL
Qty: 30 TAB | Refills: 0 | Status: SHIPPED | OUTPATIENT
Start: 2019-03-28 | End: 2019-05-01 | Stop reason: SDUPTHER

## 2019-03-29 DIAGNOSIS — I10 ESSENTIAL HYPERTENSION: ICD-10-CM

## 2019-03-29 RX ORDER — DILTIAZEM HYDROCHLORIDE 360 MG/1
CAPSULE, EXTENDED RELEASE ORAL
Qty: 90 CAP | Refills: 0 | Status: SHIPPED | OUTPATIENT
Start: 2019-03-29 | End: 2019-06-24 | Stop reason: SDUPTHER

## 2019-04-04 ENCOUNTER — OFFICE VISIT (OUTPATIENT)
Dept: INTERNAL MEDICINE CLINIC | Age: 64
End: 2019-04-04

## 2019-04-04 VITALS
HEIGHT: 72 IN | WEIGHT: 213.2 LBS | SYSTOLIC BLOOD PRESSURE: 136 MMHG | HEART RATE: 69 BPM | DIASTOLIC BLOOD PRESSURE: 76 MMHG | RESPIRATION RATE: 18 BRPM | OXYGEN SATURATION: 98 % | BODY MASS INDEX: 28.88 KG/M2 | TEMPERATURE: 97.9 F

## 2019-04-04 DIAGNOSIS — I10 ESSENTIAL HYPERTENSION: Chronic | ICD-10-CM

## 2019-04-04 DIAGNOSIS — E11.319 TYPE 2 DIABETES MELLITUS WITH RETINOPATHY OF BOTH EYES, WITHOUT LONG-TERM CURRENT USE OF INSULIN, MACULAR EDEMA PRESENCE UNSPECIFIED, UNSPECIFIED RETINOPATHY SEVERITY (HCC): Chronic | ICD-10-CM

## 2019-04-04 DIAGNOSIS — I48.0 PAROXYSMAL ATRIAL FIBRILLATION (HCC): Chronic | ICD-10-CM

## 2019-04-04 DIAGNOSIS — L97.511 SKIN ULCER OF TOE OF RIGHT FOOT, LIMITED TO BREAKDOWN OF SKIN (HCC): ICD-10-CM

## 2019-04-04 DIAGNOSIS — E11.40 TYPE 2 DIABETES MELLITUS WITH DIABETIC NEUROPATHY, WITHOUT LONG-TERM CURRENT USE OF INSULIN (HCC): Primary | Chronic | ICD-10-CM

## 2019-04-04 PROBLEM — D50.0 CHRONIC BLOOD LOSS ANEMIA: Chronic | Status: ACTIVE | Noted: 2017-07-26

## 2019-04-04 LAB
A-G RATIO,AGRAT: 1.5 RATIO
ALBUMIN SERPL-MCNC: 4.8 G/DL (ref 3.9–5.4)
ALP SERPL-CCNC: 92 U/L (ref 38–126)
ALT SERPL-CCNC: 24 U/L (ref 9–52)
ANION GAP SERPL CALC-SCNC: 15 MMOL/L
AST SERPL W P-5'-P-CCNC: 34 U/L (ref 14–36)
BILIRUB SERPL-MCNC: 0.7 MG/DL (ref 0.2–1.3)
BILIRUB UR QL: NEGATIVE
BUN SERPL-MCNC: 19 MG/DL (ref 9–20)
BUN/CREATININE RATIO,BUCR: 17 RATIO
CALCIUM SERPL-MCNC: 10 MG/DL (ref 8.4–10.2)
CHLORIDE SERPL-SCNC: 97 MMOL/L (ref 98–107)
CHOL/HDL RATIO,CHHD: 3 RATIO (ref 0–4)
CHOLEST SERPL-MCNC: 149 MG/DL (ref 0–200)
CLARITY: CLEAR
CO2 SERPL-SCNC: 30 MMOL/L (ref 22–32)
COLOR UR: ABNORMAL
CREAT SERPL-MCNC: 1.1 MG/DL (ref 0.8–1.5)
ERYTHROCYTE [DISTWIDTH] IN BLOOD BY AUTOMATED COUNT: 16.1 %
GLOBULIN,GLOB: 3.2
GLUCOSE 24H UR-MRATE: ABNORMAL G/(24.H)
GLUCOSE SERPL-MCNC: 135 MG/DL (ref 75–110)
HCT VFR BLD AUTO: 49.8 % (ref 37–51)
HDLC SERPL-MCNC: 54 MG/DL (ref 35–130)
HGB BLD-MCNC: 16.5 G/DL (ref 12–18)
HGB UR QL STRIP: NEGATIVE
KETONES UR QL STRIP.AUTO: ABNORMAL
LDL/HDL RATIO,LDHD: 1 RATIO
LDLC SERPL CALC-MCNC: 75 MG/DL (ref 0–130)
LEUKOCYTE ESTERASE: NEGATIVE
LYMPHOCYTES ABSOLUTE: 1.2 K/UL (ref 0.6–4.1)
LYMPHOCYTES NFR BLD: 16.5 % (ref 10–58.5)
MCH RBC QN AUTO: 30.1 PG (ref 26–32)
MCHC RBC AUTO-ENTMCNC: 33.1 G/DL (ref 30–36)
MCV RBC AUTO: 90.8 FL (ref 80–97)
MICROALBUMIN, URINE: 20 MG/L (ref 0–20)
MONOCYTES ABS-DIF,2141: 0.3 K/UL (ref 0–1.8)
MONOCYTES NFR BLD: 4.8 % (ref 0.1–24)
NEUTROPHILS # BLD: 78.7 % (ref 37–92)
NEUTROPHILS ABS,2156: 5.7 K/UL (ref 2–7.8)
NITRITE UR QL STRIP.AUTO: NEGATIVE
PH UR STRIP: 6 [PH] (ref 5–7)
PLATELET # BLD AUTO: 244 K/UL (ref 140–440)
PMV BLD AUTO: 7.7 FL
POTASSIUM SERPL-SCNC: 4.8 MMOL/L (ref 3.6–5)
PROT SERPL-MCNC: 8 G/DL (ref 6.3–8.2)
PROT UR STRIP-MCNC: ABNORMAL MG/DL
RBC # BLD AUTO: 5.48 M/UL (ref 4.2–6.3)
RBC #/AREA URNS HPF: 0 #/HPF
SODIUM SERPL-SCNC: 142 MMOL/L (ref 137–145)
SP GR UR REFRACTOMETRY: 1.02 (ref 1–1.03)
TRIGL SERPL-MCNC: 102 MG/DL (ref 0–200)
TSH SERPL DL<=0.05 MIU/L-ACNC: 1.36 UIU/ML (ref 0.34–5.6)
UROBILINOGEN UR QL STRIP.AUTO: NEGATIVE
VLDLC SERPL CALC-MCNC: 20 MG/DL
WBC # BLD AUTO: 7.2 K/UL (ref 4.1–10.9)
WBC URNS QL MICRO: 0 #/HPF

## 2019-04-04 RX ORDER — DOXYCYCLINE HYCLATE 100 MG
100 TABLET ORAL 2 TIMES DAILY
Qty: 14 TAB | Refills: 0 | Status: SHIPPED | OUTPATIENT
Start: 2019-04-04 | End: 2019-04-11

## 2019-04-04 RX ORDER — METRONIDAZOLE 500 MG/1
500 TABLET ORAL 3 TIMES DAILY
Qty: 21 TAB | Refills: 0 | Status: SHIPPED | OUTPATIENT
Start: 2019-04-04 | End: 2019-04-11

## 2019-04-04 NOTE — PATIENT INSTRUCTIONS
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
## 2019-04-05 LAB — HBA1C MFR BLD: 6 % (ref 4.8–5.6)

## 2019-05-13 RX ORDER — GLIPIZIDE 10 MG/1
TABLET ORAL
Qty: 360 TAB | Refills: 0 | Status: SHIPPED | OUTPATIENT
Start: 2019-05-13 | End: 2019-08-16 | Stop reason: SDUPTHER

## 2019-06-24 DIAGNOSIS — I10 ESSENTIAL HYPERTENSION: ICD-10-CM

## 2019-06-24 RX ORDER — DILTIAZEM HYDROCHLORIDE 360 MG/1
CAPSULE, EXTENDED RELEASE ORAL
Qty: 90 CAP | Refills: 0 | Status: SHIPPED | OUTPATIENT
Start: 2019-06-24 | End: 2019-09-23 | Stop reason: SDUPTHER

## 2019-08-16 RX ORDER — GLIPIZIDE 10 MG/1
TABLET ORAL
Qty: 360 TAB | Refills: 0 | Status: SHIPPED | OUTPATIENT
Start: 2019-08-16 | End: 2019-11-12 | Stop reason: SDUPTHER

## 2019-08-27 ENCOUNTER — TELEPHONE (OUTPATIENT)
Dept: INTERNAL MEDICINE CLINIC | Age: 64
End: 2019-08-27

## 2019-08-27 NOTE — TELEPHONE ENCOUNTER
The [place] is too expensive and was denied by insurance. Would like to know if there is another medication that he can take that would be cheaper.     Call Back Wife [phone]

## 2019-08-28 NOTE — TELEPHONE ENCOUNTER
Glipizide and metformin are the cheap medications.     Please have patient check with pharmacist to see if a medication similar to [employer] is covered by his insurance such as Invokana or [place]

## 2019-08-28 NOTE — TELEPHONE ENCOUNTER
Patient's wife states she will call pharmacy and insurance company and see what medication they cover that is similar to [place]. She will call us back after she finds out.

## 2019-09-05 NOTE — TELEPHONE ENCOUNTER
[place] and Noem[place] Bhagat are just as expensive.  Wife states he is really working hard on diet and would like to know if you can just increase his Glipizide

## 2019-09-05 NOTE — TELEPHONE ENCOUNTER
He has maxed out on his glipizide he should check on if his insurance covers Trulicity, Ozempic or Victoza. This is the next class of drugs we will have to explore.

## 2019-09-06 NOTE — TELEPHONE ENCOUNTER
Patients wife states he has had Pancreatitis in the past and did not think these medications would be a good fit for him?

## 2019-09-07 NOTE — TELEPHONE ENCOUNTER
Next option is to either start insulin or can refer to endocrinologist to see if any other treatment options exist

## 2019-09-23 DIAGNOSIS — I10 ESSENTIAL HYPERTENSION: ICD-10-CM

## 2019-09-23 RX ORDER — DILTIAZEM HYDROCHLORIDE 360 MG/1
CAPSULE, EXTENDED RELEASE ORAL
Qty: 90 CAP | Refills: 0 | Status: SHIPPED | OUTPATIENT
Start: 2019-09-23 | End: 2019-12-30 | Stop reason: SDUPTHER

## 2019-10-03 ENCOUNTER — OFFICE VISIT (OUTPATIENT)
Dept: INTERNAL MEDICINE CLINIC | Age: 64
End: 2019-10-03

## 2019-10-03 VITALS
RESPIRATION RATE: 22 BRPM | HEIGHT: 72 IN | HEART RATE: 73 BPM | BODY MASS INDEX: 28.77 KG/M2 | SYSTOLIC BLOOD PRESSURE: 134 MMHG | DIASTOLIC BLOOD PRESSURE: 74 MMHG | OXYGEN SATURATION: 96 % | TEMPERATURE: 98 F | WEIGHT: 212.4 LBS

## 2019-10-03 DIAGNOSIS — E11.319 TYPE 2 DIABETES MELLITUS WITH RETINOPATHY OF BOTH EYES, WITHOUT LONG-TERM CURRENT USE OF INSULIN, MACULAR EDEMA PRESENCE UNSPECIFIED, UNSPECIFIED RETINOPATHY SEVERITY (HCC): Chronic | ICD-10-CM

## 2019-10-03 DIAGNOSIS — I10 ESSENTIAL HYPERTENSION: Chronic | ICD-10-CM

## 2019-10-03 DIAGNOSIS — E11.40 TYPE 2 DIABETES MELLITUS WITH DIABETIC NEUROPATHY, WITHOUT LONG-TERM CURRENT USE OF INSULIN (HCC): Chronic | ICD-10-CM

## 2019-10-03 DIAGNOSIS — I48.0 PAROXYSMAL ATRIAL FIBRILLATION (HCC): Chronic | ICD-10-CM

## 2019-10-03 DIAGNOSIS — Z11.59 NEED FOR HEPATITIS C SCREENING TEST: ICD-10-CM

## 2019-10-03 DIAGNOSIS — Z23 ENCOUNTER FOR IMMUNIZATION: ICD-10-CM

## 2019-10-03 DIAGNOSIS — D50.0 CHRONIC BLOOD LOSS ANEMIA: Chronic | ICD-10-CM

## 2019-10-03 DIAGNOSIS — Z00.00 INITIAL MEDICARE ANNUAL WELLNESS VISIT: Primary | ICD-10-CM

## 2019-10-03 LAB
A-G RATIO,AGRAT: 1.4 RATIO
ALBUMIN SERPL-MCNC: 4.7 G/DL (ref 3.9–5.4)
ALP SERPL-CCNC: 103 U/L (ref 38–126)
ALT SERPL-CCNC: 30 U/L (ref 0–50)
ANION GAP SERPL CALC-SCNC: 14 MMOL/L
AST SERPL W P-5'-P-CCNC: 31 U/L (ref 14–36)
BACTERIA,BACTU: ABNORMAL
BILIRUB SERPL-MCNC: 0.8 MG/DL (ref 0.2–1.3)
BILIRUB UR QL: NEGATIVE
BUN SERPL-MCNC: 21 MG/DL (ref 9–20)
BUN/CREATININE RATIO,BUCR: 19 RATIO
CALCIUM SERPL-MCNC: 10 MG/DL (ref 8.4–10.2)
CHLORIDE SERPL-SCNC: 101 MMOL/L (ref 98–107)
CHOL/HDL RATIO,CHHD: 4 RATIO (ref 0–4)
CHOLEST SERPL-MCNC: 168 MG/DL (ref 0–200)
CLARITY: CLEAR
CO2 SERPL-SCNC: 27 MMOL/L (ref 22–32)
COLOR UR: ABNORMAL
CREAT SERPL-MCNC: 1.1 MG/DL (ref 0.8–1.5)
GLOBULIN,GLOB: 3.3
GLUCOSE 24H UR-MRATE: ABNORMAL G/(24.H)
GLUCOSE SERPL-MCNC: 197 MG/DL (ref 75–110)
HDLC SERPL-MCNC: 46 MG/DL (ref 35–130)
HGB UR QL STRIP: NEGATIVE
KETONES UR QL STRIP.AUTO: ABNORMAL
LDL/HDL RATIO,LDHD: 2 RATIO
LDLC SERPL CALC-MCNC: 90 MG/DL (ref 0–130)
LEUKOCYTE ESTERASE: NEGATIVE
MICROALBUMIN, URINE: 50 MG/L (ref 0–20)
NITRITE UR QL STRIP.AUTO: NEGATIVE
PH UR STRIP: 5 [PH] (ref 5–7)
POTASSIUM SERPL-SCNC: 4.7 MMOL/L (ref 3.6–5)
PROT SERPL-MCNC: 8 G/DL (ref 6.3–8.2)
PROT UR STRIP-MCNC: ABNORMAL MG/DL
RBC #/AREA URNS HPF: 0 #/HPF
SODIUM SERPL-SCNC: 142 MMOL/L (ref 137–145)
SP GR UR REFRACTOMETRY: 1.02 (ref 1–1.03)
TRIGL SERPL-MCNC: 161 MG/DL (ref 0–200)
TSH SERPL DL<=0.05 MIU/L-ACNC: 1.26 UIU/ML (ref 0.34–5.6)
UROBILINOGEN UR QL STRIP.AUTO: NEGATIVE
VLDLC SERPL CALC-MCNC: 32 MG/DL
WBC URNS QL MICRO: 0 #/HPF
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
## 2019-10-03 NOTE — PROGRESS NOTES
Chief Complaint   Patient presents with    Diabetes     6 mo fu    Annual Wellness Visit       Depression Risk Factor Screening:     3 most recent PHQ Screens 10/3/2019   Little interest or pleasure in doing things Not at all   Feeling down, depressed, irritable, or hopeless Not at all   Total Score PHQ 2 0       Functional Ability and Level of Safety:     Activities of Daily Living  ADL Assessment 10/3/2019   Feeding yourself No Help Needed   Getting from bed to chair No Help Needed   Getting dressed No Help Needed   Bathing or showering No Help Needed   Walk across the room (includes cane/walker) No Help Needed   Using the telphone No Help Needed   Taking your medications No Help Needed   Preparing meals No Help Needed   Managing money (expenses/bills) No Help Needed   Moderately strenuous housework (laundry) No Help Needed   Shopping for personal items (toiletries/medicines) No Help Needed   Shopping for groceries No Help Needed   Driving No Help Needed   Climbing a flight of stairs No Help Needed   Getting to places beyond walking distances No Help Needed       Fall Risk  Fall Risk Assessment, last 12 mths 10/3/2019   Able to walk? Yes   Fall in past 12 months? No       Abuse Screen  Abuse Screening Questionnaire 10/3/2019   Do you ever feel afraid of your partner? N   Are you in a relationship with someone who physically or mentally threatens you? N   Is it safe for you to go home? Y         Patient Care Team   Patient Care Team:  Paul Corbin MD as PCP - General (Internal Medicine)  Isaac Wilde MD as Surgeon (General Surgery)  Roxie Story MD (Gastroenterology)     After obtaining written consent and per orders of Dr. Gina Mahan, injection of Fluad (L arm) and Prevnar-13 (R arm) given by Hussain Watson. Order and injection/medication verified by second nurse/ma review by Chelly Perez LPN. Patient tolerated procedure well. VIS was given to them. No reactions noted.

## 2019-10-03 NOTE — PATIENT INSTRUCTIONS
Learning About Cutting Calories  How do calories affect your weight? Food gives your body energy. Energy from the food you eat is measured in calories. This energy keeps your heart beating, your brain active, and your muscles working. Your body needs a certain number of calories each day. After your body uses the calories it needs, it stores extra calories as fat. To lose weight safely, you have to eat fewer calories while eating in a healthy way. How many calories do you need each day? The more active you are, the more calories you need. When you are less active, you need fewer calories. How many calories you need each day also depends on several things, including your age and whether you are male or female. Here are some general guidelines for adults:  · Less active women and older adults need 1,600 to 2,000 calories each day. · Active women and less active men need 2,000 to 2,400 calories each day. · Active men need 2,400 to 3,000 calories each day. How can you cut calories and eat healthy meals? Whole grains, vegetables and fruits, and dried beans are good lower-calorie foods. They give you lots of nutrients and fiber. And they fill you up. Sweets, energy drinks, and soda pop are high in calories. They give you few nutrients and no fiber. Try to limit soda pop, fruit juice, and energy drinks. Drink water instead. Some fats can be part of a healthy diet. But cutting back on fats from highly processed foods like fast foods and many snack foods is a good way to lower the calories in your diet. Also, use smaller amounts of fats like butter, margarine, salad dressing, and mayonnaise. Add fresh garlic, lemon, or herbs to your meals to add flavor without adding fat. Meats and dairy products can be a big source of hidden fats. Try to choose lean or low-fat versions of these products. Fat-free cookies, candies, chips, and frozen treats can still be high in sugar and calories.  Some fat-free foods have more calories than regular ones. Eat fat-free treats in moderation, as you would other foods. If your favorite foods are high in fat, salt, sugar, or calories, limit how often you eat them. Eat smaller servings, or look for healthy substitutes. Fill up on fruits, vegetables, and whole grains. Eating at home  · Use meat as a side dish instead of as the main part of your meal.  · Try main dishes that use whole wheat pasta, brown rice, dried beans, or vegetables. · Find ways to cook with little or no fat, such as broiling, steaming, or grilling. · Use cooking spray instead of oil. If you use oil, use a monounsaturated oil, such as canola or olive oil. · Trim fat from meats before you cook them. · Drain off fat after you brown the meat or while you roast it. · Chill soups and stews after you cook them. Then skim the fat off the top after it hardens. Eating out  · Order foods that are broiled or poached rather than fried or breaded. · Cut back on the amount of butter or margarine that you use on bread. · Order sauces, gravies, and salad dressings on the side, and use only a little. · When you order pasta, choose tomato sauce rather than cream sauce. · Ask for salsa with your baked potato instead of sour cream, butter, cheese, or bryson. · Order meals in a small size instead of upgrading to a large. · Share an entree, or take part of your food home to eat as another meal.  · Share appetizers and desserts. Where can you learn more? Go to http://isra-hadley.info/. Enter [id number] in the search box to learn more about \"Learning About Cutting Calories. \"  Current as of: November 7, 2018  Content Version: 12.2  © 1717-7079 [employer]. Care instructions adapted under license by [employer] (which disclaims liability or warranty for this information).  If you have questions about a medical condition or this instruction, always ask your healthcare professional. [employer] disclaims any warranty or liability for your use of this information. The best way to stay healthy is to live a healthy lifestyle. A healthy lifestyle includes regular exercise, eating a well-balanced diet, keeping a healthy weight and not smoking. Regular physical exams and screening tests are another important way to take care of yourself. Preventive exams provided by health care providers can find health problems early when treatment works best and can keep you from getting certain diseases or illnesses. Preventive services include exams, lab tests, screenings, shots, monitoring and information to help you take care of your own health. All people over 65 should have a pneumonia shot. Pneumonia shots are usually only needed once in a lifetime unless your doctor decides differently. In addition to your physical exam, some screening tests are recommended:    All people over 65 should have a yearly flu shot. People over 65 are at medium to high risk for Hepatitis B. Three shots are needed for complete protection. Bone mass measurement (dexa scan) is recommended every two years. Diabetes Mellitus screening is recommended every year. Glaucoma is an eye disease caused by high pressure in the eye. An eye exam is recommended every year. Cardiovascular screening tests that check your cholesterol and other blood fat (lipid) levels are recommended every five years. Colorectal Cancer screening tests help to find pre-cancerous polyps (growths in the colon) so they can be removed before they turn into cancer. Tests ordered for screening depend on your personal and family history risk factors. Prostate Cancer Screening (annually up to age 76)    Screening for breast cancer is recommended yearly with a Mammogram.    Screening for cervical and vaginal cancer is recommended with a pelvic and Pap test every two years.  However if you have had an abnormal pap in the past  three years or at high risk for cervical or vaginal cancer Medicare will cover a pap test and a pelvic exam every year. Here is a list of your current Health Maintenance items with a due date:  Health Maintenance Due   Topic Date Due    Hepatitis C Test  1955    DTaP/Tdap/Td  (1 - Tdap) 07/17/1974    Shingles Vaccine (1 of 2) 07/17/2003    Pneumococcal Vaccine (1 of 2 - PCV13) 07/17/2018    Stool testing for trace blood  07/26/2018    Annual Well Visit  11/14/2018    Flu Vaccine  08/01/2019    Hemoglobin A1C    10/04/2019       Vaccine Information Statement    Influenza (Flu) Vaccine (Inactivated or Recombinant): What You Need to Know    Many Vaccine Information Statements are available in Amharic and other languages. See www.immunize.org/vis  Hojas de información sobre vacunas están disponibles en español y en muchos otros idiomas. Visite www.immunize.org/vis    1. Why get vaccinated? Influenza vaccine can prevent influenza (flu). Flu is a contagious disease that spreads around the [place] every year, usually between October and May. Anyone can get the flu, but it is more dangerous for some people. Infants and young children, people 72years of age and older, pregnant women, and people with certain health conditions or a weakened immune system are at greatest risk of flu complications. Pneumonia, bronchitis, sinus infections and ear infections are examples of flu-related complications. If you have a medical condition, such as heart disease, cancer or diabetes, flu can make it worse. Flu can cause fever and chills, sore throat, muscle aches, fatigue, cough, headache, and runny or stuffy nose. Some people may have vomiting and diarrhea, though this is more common in children than adults. Each year thousands of people in the [facility] die from flu, and many more are hospitalized. Flu vaccine prevents millions of illnesses and flu-related visits to the doctor each year.     2. Influenza vaccines     CDC recommends everyone 10months of age and older get vaccinated every flu season. Children 6 months through 6years of age may need 2 doses during a single flu season. Everyone else needs only 1 dose each flu season. It takes about 2 weeks for protection to develop after vaccination. There are many flu viruses, and they are always changing. Each year a new flu vaccine is made to protect against three or four viruses that are likely to cause disease in the upcoming flu season. Even when the vaccine doesnt exactly match these viruses, it may still provide some protection. Influenza vaccine does not cause flu. Influenza vaccine may be given at the same time as other vaccines. 3. Talk with your health care provider    Tell your vaccine provider if the person getting the vaccine:   Has had an allergic reaction after a previous dose of influenza vaccine, or has any severe, life-threatening allergies.  Has ever had Guillain-Barré Syndrome (also called GBS). In some cases, your health care provider may decide to postpone influenza vaccination to a future visit. People with minor illnesses, such as a cold, may be vaccinated. People who are moderately or severely ill should usually wait until they recover before getting influenza vaccine. Your health care provider can give you more information. 4. Risks of a reaction     Soreness, redness, and swelling where shot is given, fever, muscle aches, and headache can happen after influenza vaccine.  There may be a very small increased risk of Guillain-Barré Syndrome (GBS) after inactivated influenza vaccine (the flu shot). Rozina Ochoa children who get the flu shot along with pneumococcal vaccine (PCV13), and/or DTaP vaccine at the same time might be slightly more likely to have a seizure caused by fever. Tell your health care provider if a child who is getting flu vaccine has ever had a seizure.     People sometimes faint after medical procedures, including vaccination. Tell your provider if you feel dizzy or have vision changes or ringing in the ears. As with any medicine, there is a very remote chance of a vaccine causing a severe allergic reaction, other serious injury, or death. 5. What if there is a serious problem? An allergic reaction could occur after the vaccinated person leaves the clinic. If you see signs of a severe allergic reaction (hives, swelling of the face and throat, difficulty breathing, a fast heartbeat, dizziness, or weakness), call 9-1-1 and get the person to the nearest hospital.    For other signs that concern you, call your health care provider. Adverse reactions should be reported to the Vaccine Adverse Event Reporting System (VAERS). Your health care provider will usually file this report, or you can do it yourself. Visit the VAERS website at www.vaers. hhs.gov or call 4-200.834.3427. VAERS is only for reporting reactions, and VAERS staff do not give medical advice. 6. The National Vaccine Injury Compensation Program    The [facility] Vaccine Injury Compensation Program (VICP) is a federal program that was created to compensate people who may have been injured by certain vaccines. Visit the VICP website at www.hrsa.gov/vaccinecompensation or call 7-879.274.2068 to learn about the program and about filing a claim. There is a time limit to file a claim for compensation. 7. How can I learn more?  Ask your health care provider.  Call your local or state health department.  Contact the Centers for Disease Control and Prevention (CDC):  - Call 4-196.527.7332 (1-800-CDC-INFO) or  - Visit CDCs influenza website at www.cdc.gov/flu    Vaccine Information Statement (Interim)  Inactivated Influenza Vaccine   8/15/2019  42 STACY Cadet 055XX-27   Department of Health and Human Services  Centers for Disease Control and Prevention    Office Use Only      Vaccine Information Statement Pneumococcal Conjugate Vaccine (PCV13): What You Need to Know    Many Vaccine Information Statements are available in Greenlandic and other languages. See www.immunize.org/vis. Hojas de información Sobre Vacunas están disponibles en español y en muchos otros idiomas. Visite www.immunize.org/vis. 1. Why get vaccinated? Vaccination can protect both children and adults from pneumococcal disease. Pneumococcal disease is caused by bacteria that can spread from person to person through close contact. It can cause ear infections, and it can also lead to more serious infections of the:   Lungs (pneumonia),   Blood (bacteremia), and   Covering of the brain and spinal cord (meningitis). Pneumococcal pneumonia is most common among adults. Pneumococcal meningitis can cause deafness and brain damage, and it kills about 1 child in 10 who get it. Anyone can get pneumococcal disease, but children under 3years of age and adults 72 years and older, people with certain medical conditions, and cigarette smokers are at the highest risk. Before there was a vaccine, the [facility] saw:   more than 700 cases of meningitis,   about 13,000 blood infections,   about 5 million ear infections, and   about 200 deaths  in children under 5 each year from pneumococcal disease. Since vaccine became available, severe pneumococcal disease in these children has fallen by 88%. About 18,000 older adults die of pneumococcal disease each year in the [place]. Treatment of pneumococcal infections with penicillin and other drugs is not as effective as it used to be, because some strains of the disease have become resistant to these drugs. This makes prevention of the disease, through vaccination, even more important. 2. PCV13 vaccine    Pneumococcal conjugate vaccine (called PCV13) protects against 13 types of pneumococcal bacteria. PCV13 is routinely given to children at 2, 4, 6, and 1515 months of age.  It is also recommended for children and adults 3to 59years of age with certain health conditions, and for all adults 72years of age and older. Your doctor can give you details. 3. Some people should not get this vaccine    Anyone who has ever had a life-threatening allergic reaction to a dose of this vaccine, to an earlier pneumococcal vaccine called PCV7, or to any vaccine containing diphtheria toxoid (for example, DTaP), should not get PCV13. Anyone with a severe allergy to any component of PCV13 should not get the vaccine. Tell your doctor if the person being vaccinated has any severe allergies. If the person scheduled for vaccination is not feeling well, your healthcare provider might decide to reschedule the shot on another day. 4. Risks of a vaccine reaction    With any medicine, including vaccines, there is a chance of reactions. These are usually mild and go away on their own, but serious reactions are also possible. Problems reported following PCV13 varied by age and dose in the series. The most common problems reported among children were:    About half became drowsy after the shot, had a temporary loss of appetite, or had redness or tenderness where the shot was given.  About 1 out of 3 had swelling where the shot was given.  About 1 out of 3 had a mild fever, and about 1 in 20 had a fever over 102.2°F.   Up to about 8 out of 10 became fussy or irritable. Adults have reported pain, redness, and swelling where the shot was given; also mild fever, fatigue, headache, chills, or muscle pain. Ulysses Alphonse children who get PCV13 along with inactivated flu vaccine at the same time may be at increased risk for seizures caused by fever. Ask your doctor for more information. Problems that could happen after any vaccine:     People sometimes faint after a medical procedure, including vaccination.  Sitting or lying down for about 15 minutes can help prevent fainting, and injuries caused by a fall. Tell your doctor if you feel dizzy, or have vision changes or ringing in the ears.  Some older children and adults get severe pain in the shoulder and have difficulty moving the arm where a shot was given. This happens very rarely.  Any medication can cause a severe allergic reaction. Such reactions from a vaccine are very rare, estimated at about 1 in a million doses, and would happen within a few minutes to a few hours after the vaccination. As with any medicine, there is a very small chance of a vaccine causing a serious injury or death. The safety of vaccines is always being monitored. For more information, visit: www.cdc.gov/vaccinesafety/     5. What if there is a serious reaction? What should I look for?  Look for anything that concerns you, such as signs of a severe allergic reaction, very high fever, or unusual behavior. Signs of a severe allergic reaction can include hives, swelling of the face and throat, difficulty breathing, a fast heartbeat, dizziness, and weakness - usually within a few minutes to a few hours after the vaccination. What should I do?  If you think it is a severe allergic reaction or other emergency that cant wait, call 9-1-1 or get the person to the nearest hospital. Otherwise, call your doctor. Reactions should be reported to the Vaccine Adverse Event Reporting System (VAERS). Your doctor should file this report, or you can do it yourself through the VAERS web site at www.vaers. hhs.gov, or by calling 4-492.430.8089. VAERS does not give medical advice. 6. The National Vaccine Injury Compensation Program    The [facility] Vaccine Injury Compensation Program (VICP) is a federal program that was created to compensate people who may have been injured by certain vaccines.     Persons who believe they may have been injured by a vaccine can learn about the program and about filing a claim by calling 2-301.975.4257 or visiting the [employer] website at www.hrsa.gov/vaccinecompensation. There is a time limit to file a claim for compensation. 7. How can I learn more?  Ask your healthcare provider. He or she can give you the vaccine package insert or suggest other sources of information.  Call your local or state health department.  Contact the Centers for Disease Control and Prevention (CDC):  - Call 9-236.904.5149 (1-800-CDC-INFO) or  - Visit CDCs website at www.cdc.gov/vaccines    Vaccine Information Statement   PCV13 Vaccine   11/5/2015   42 STACY Vega 814HX-60    Department of Health and Human Services  Centers for Disease Control and Prevention    Office Use Only

## 2019-10-04 LAB
BASOPHILS # BLD AUTO: 0 X10E3/UL (ref 0–0.2)
BASOPHILS NFR BLD AUTO: 1 %
EOSINOPHIL # BLD AUTO: 0.2 X10E3/UL (ref 0–0.4)
EOSINOPHIL NFR BLD AUTO: 3 %
ERYTHROCYTE [DISTWIDTH] IN BLOOD BY AUTOMATED COUNT: 12.5 % (ref 12.3–15.4)
HBA1C MFR BLD HPLC: 5.2 % (ref 4–5.7)
HCT VFR BLD AUTO: 46.9 % (ref 37.5–51)
HCV AB S/CO SERPL IA: <0.1 S/CO RATIO (ref 0–0.9)
HGB BLD-MCNC: 16.4 G/DL (ref 13–17.7)
IMM GRANULOCYTES # BLD AUTO: 0 X10E3/UL (ref 0–0.1)
IMM GRANULOCYTES NFR BLD AUTO: 0 %
LYMPHOCYTES # BLD AUTO: 1 X10E3/UL (ref 0.7–3.1)
LYMPHOCYTES NFR BLD AUTO: 14 %
MCH RBC QN AUTO: 32.2 PG (ref 26.6–33)
MCHC RBC AUTO-ENTMCNC: 35 G/DL (ref 31.5–35.7)
MCV RBC AUTO: 92 FL (ref 79–97)
MONOCYTES # BLD AUTO: 0.5 X10E3/UL (ref 0.1–0.9)
MONOCYTES NFR BLD AUTO: 8 %
NEUTROPHILS # BLD AUTO: 5.3 X10E3/UL (ref 1.4–7)
NEUTROPHILS NFR BLD AUTO: 74 %
PLATELET # BLD AUTO: 253 X10E3/UL (ref 150–450)
RBC # BLD AUTO: 5.09 X10E6/UL (ref 4.14–5.8)
WBC # BLD AUTO: 7.1 X10E3/UL (ref 3.4–10.8)

## 2019-10-05 NOTE — PROGRESS NOTES
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

## 2019-12-30 DIAGNOSIS — I10 ESSENTIAL HYPERTENSION: ICD-10-CM

## 2019-12-30 RX ORDER — DILTIAZEM HYDROCHLORIDE 360 MG/1
CAPSULE, EXTENDED RELEASE ORAL
Qty: 90 CAP | Refills: 0 | Status: SHIPPED | OUTPATIENT
Start: 2019-12-30 | End: 2020-03-29

## 2019-12-30 NOTE — TELEPHONE ENCOUNTER
PCP: Barbie Smalls MD    Last appt: 10/3/2019  Future Appointments   Date Time Provider [place]   4/8/2020 10:00 AM Barbie Smalls MD [facility]       Requested Prescriptions     Pending Prescriptions Disp Refills    dilTIAZem (TIAZAC) 360 mg SR capsule 90 Cap 0       Prior labs and Blood pressures:  BP Readings from Last 3 Encounters:   10/03/19 134/74   04/04/19 136/76   11/14/18 138/80     Lab Results   Component Value Date/Time    Sodium 142 10/03/2019 11:41 AM    Potassium 4.7 10/03/2019 11:41 AM    Chloride 101 10/03/2019 11:41 AM    CO2 27.0 10/03/2019 11:41 AM    Anion gap 14 10/03/2019 11:41 AM    Glucose 197 (H) 10/03/2019 11:41 AM    BUN 21.0 (H) 10/03/2019 11:41 AM    Creatinine 1.1 10/03/2019 11:41 AM    BUN/Creatinine ratio 19 10/03/2019 11:41 AM    GFR est AA >60 10/03/2019 11:41 AM    GFR est non-AA >60 10/03/2019 11:41 AM    Calcium 10.0 10/03/2019 11:41 AM     Lab Results   Component Value Date/Time    Hemoglobin A1c 5.2 10/03/2019 11:41 AM    Hemoglobin A1c (POC) 9.5 (A) 11/17/2017 11:46 AM     Lab Results   Component Value Date/Time    Cholesterol, total 168 10/03/2019 11:41 AM    Cholesterol (POC) 134.0 11/17/2017 11:46 AM    HDL Cholesterol 46 10/03/2019 11:41 AM    HDL Cholesterol (POC) 40.0 11/17/2017 11:46 AM    LDL Cholesterol (POC) 75.4 11/17/2017 11:46 AM    LDL, calculated 90 10/03/2019 11:41 AM    VLDL, calculated 50.2 10/26/2010 05:40 AM    VLDL 32 10/03/2019 11:41 AM    Triglyceride 161 10/03/2019 11:41 AM    Triglycerides (POC) 93.0 11/17/2017 11:46 AM    CHOL/HDL Ratio 4 10/03/2019 11:41 AM     Lab Results   Component Value Date/Time    Vitamin D 25-Hydroxy 26.7 (L) 10/20/2014 04:05 PM       Lab Results   Component Value Date/Time    TSH 2.88 10/26/2010 05:40 AM    TSH, 3rd generation 1.26 10/03/2019 11:41 AM

## 2020-03-29 DIAGNOSIS — I10 ESSENTIAL HYPERTENSION: ICD-10-CM

## 2020-03-29 RX ORDER — DILTIAZEM HYDROCHLORIDE 360 MG/1
CAPSULE, EXTENDED RELEASE ORAL
Qty: 90 CAP | Refills: 0 | Status: SHIPPED | OUTPATIENT
Start: 2020-03-29 | End: 2020-06-27

## 2020-05-14 RX ORDER — GLIPIZIDE 10 MG/1
TABLET ORAL
Qty: 360 TAB | Refills: 1 | Status: SHIPPED | OUTPATIENT
Start: 2020-05-14 | End: 2020-11-10

## 2020-06-27 DIAGNOSIS — I10 ESSENTIAL HYPERTENSION: ICD-10-CM

## 2020-06-27 RX ORDER — DILTIAZEM HYDROCHLORIDE 360 MG/1
CAPSULE, EXTENDED RELEASE ORAL
Qty: 90 CAP | Refills: 0 | Status: SHIPPED | OUTPATIENT
Start: 2020-06-27 | End: 2020-09-28 | Stop reason: SDUPTHER

## 2020-07-02 ENCOUNTER — OFFICE VISIT (OUTPATIENT)
Dept: INTERNAL MEDICINE CLINIC | Age: 65
End: 2020-07-02

## 2020-07-02 VITALS
HEART RATE: 73 BPM | TEMPERATURE: 98.5 F | HEIGHT: 72 IN | DIASTOLIC BLOOD PRESSURE: 70 MMHG | RESPIRATION RATE: 20 BRPM | SYSTOLIC BLOOD PRESSURE: 136 MMHG | BODY MASS INDEX: 29.15 KG/M2 | OXYGEN SATURATION: 97 % | WEIGHT: 215.2 LBS

## 2020-07-02 DIAGNOSIS — E11.40 TYPE 2 DIABETES MELLITUS WITH DIABETIC NEUROPATHY, WITHOUT LONG-TERM CURRENT USE OF INSULIN (HCC): Primary | Chronic | ICD-10-CM

## 2020-07-02 DIAGNOSIS — E11.319 TYPE 2 DIABETES MELLITUS WITH RETINOPATHY OF BOTH EYES, WITHOUT LONG-TERM CURRENT USE OF INSULIN, MACULAR EDEMA PRESENCE UNSPECIFIED, UNSPECIFIED RETINOPATHY SEVERITY (HCC): Chronic | ICD-10-CM

## 2020-07-02 DIAGNOSIS — D50.0 CHRONIC BLOOD LOSS ANEMIA: Chronic | ICD-10-CM

## 2020-07-02 DIAGNOSIS — I48.0 PAROXYSMAL ATRIAL FIBRILLATION (HCC): Chronic | ICD-10-CM

## 2020-07-02 DIAGNOSIS — I10 ESSENTIAL HYPERTENSION: Chronic | ICD-10-CM

## 2020-07-02 DIAGNOSIS — N52.9 ED (ERECTILE DYSFUNCTION) OF ORGANIC ORIGIN: ICD-10-CM

## 2020-07-02 PROBLEM — L97.309 DIABETIC ANKLE ULCER (HCC): Status: RESOLVED | Noted: 2018-11-14 | Resolved: 2020-07-02

## 2020-07-02 PROBLEM — E11.622 DIABETIC ANKLE ULCER (HCC): Status: RESOLVED | Noted: 2018-11-14 | Resolved: 2020-07-02

## 2020-07-02 PROBLEM — L97.511 SKIN ULCER OF TOE OF RIGHT FOOT, LIMITED TO BREAKDOWN OF SKIN (HCC): Status: RESOLVED | Noted: 2019-04-04 | Resolved: 2020-07-02

## 2020-07-02 LAB
A-G RATIO,AGRAT: 1.6 RATIO
ALBUMIN SERPL-MCNC: 4.4 G/DL (ref 3.9–5.4)
ALP SERPL-CCNC: 111 U/L (ref 38–126)
ALT SERPL-CCNC: 38 U/L (ref 0–50)
ANION GAP SERPL CALC-SCNC: 14 MMOL/L
AST SERPL W P-5'-P-CCNC: 46 U/L (ref 14–36)
BILIRUB SERPL-MCNC: 0.7 MG/DL (ref 0.2–1.3)
BILIRUB UR QL: NEGATIVE
BUN SERPL-MCNC: 15 MG/DL (ref 9–20)
BUN/CREATININE RATIO,BUCR: 17 RATIO
CALCIUM SERPL-MCNC: 9.6 MG/DL (ref 8.4–10.2)
CHLORIDE SERPL-SCNC: 101 MMOL/L (ref 98–107)
CHOL/HDL RATIO,CHHD: 3 RATIO (ref 0–4)
CHOLEST SERPL-MCNC: 139 MG/DL (ref 0–200)
CLARITY: CLEAR
CO2 SERPL-SCNC: 24 MMOL/L (ref 22–32)
COLOR UR: ABNORMAL
CREAT SERPL-MCNC: 0.9 MG/DL (ref 0.8–1.5)
ERYTHROCYTE [DISTWIDTH] IN BLOOD BY AUTOMATED COUNT: 13.3 %
GLOBULIN,GLOB: 2.8
GLUCOSE 24H UR-MRATE: ABNORMAL G/(24.H)
GLUCOSE SERPL-MCNC: 172 MG/DL (ref 75–110)
HBA1C MFR BLD HPLC: 6.3 % (ref 4–5.7)
HCT VFR BLD AUTO: 44.4 % (ref 37–51)
HDLC SERPL-MCNC: 45 MG/DL (ref 35–130)
HGB BLD-MCNC: 14.7 G/DL (ref 12–18)
HGB UR QL STRIP: NEGATIVE
KETONES UR QL STRIP.AUTO: NEGATIVE
LDL/HDL RATIO,LDHD: 1 RATIO
LDLC SERPL CALC-MCNC: 63 MG/DL (ref 0–130)
LEUKOCYTE ESTERASE: NEGATIVE
LYMPHOCYTES ABSOLUTE: 1.1 K/UL (ref 0.6–4.1)
LYMPHOCYTES NFR BLD: 20.4 % (ref 10–58.5)
MCH RBC QN AUTO: 32 PG (ref 26–32)
MCHC RBC AUTO-ENTMCNC: 33.1 G/DL (ref 30–36)
MCV RBC AUTO: 96.6 FL (ref 80–97)
MICROALBUMIN, URINE: 100 MG/L (ref 0–20)
MONOCYTES ABS-DIF,2141: 0.4 K/UL (ref 0–1.8)
MONOCYTES NFR BLD: 8 % (ref 0.1–24)
NEUTROPHILS # BLD: 71.6 % (ref 37–92)
NEUTROPHILS ABS,2156: 3.7 K/UL (ref 2–7.8)
NITRITE UR QL STRIP.AUTO: NEGATIVE
PH UR STRIP: 6 [PH] (ref 5–7)
PLATELET # BLD AUTO: 235 K/UL (ref 140–440)
POTASSIUM SERPL-SCNC: 4.5 MMOL/L (ref 3.6–5)
PROT SERPL-MCNC: 7.2 G/DL (ref 6.3–8.2)
PROT UR STRIP-MCNC: ABNORMAL MG/DL
RBC # BLD AUTO: 4.6 M/UL (ref 4.2–6.3)
RBC #/AREA URNS HPF: 0 #/HPF
SODIUM SERPL-SCNC: 139 MMOL/L (ref 137–145)
SP GR UR REFRACTOMETRY: 1.02 (ref 1–1.03)
TRIGL SERPL-MCNC: 156 MG/DL (ref 0–200)
TSH SERPL DL<=0.05 MIU/L-ACNC: 0.84 UIU/ML (ref 0.34–5.6)
UROBILINOGEN UR QL STRIP.AUTO: NEGATIVE
VLDLC SERPL CALC-MCNC: 31 MG/DL
WBC # BLD AUTO: 5.2 K/UL (ref 4.1–10.9)
WBC URNS QL MICRO: 0 #/HPF

## 2020-07-02 RX ORDER — TADALAFIL 20 MG/1
20 TABLET ORAL AS NEEDED
Qty: 50 TAB | Refills: 0 | Status: SHIPPED | OUTPATIENT
Start: 2020-07-02 | End: 2021-06-07 | Stop reason: ALTCHOICE

## 2020-07-02 NOTE — PROGRESS NOTES
Willie Hood is a 75 y.o. male presenting for Follow Up Chronic Condition (6 mo fu)  . 1. Have you been to the ER, urgent care clinic since your last visit? Hospitalized since your last visit? No    2. Have you seen or consulted any other health care providers outside of the [address] [place] [address] since your last visit? Include any pap smears or colon screening. Eye Dr Melissa Fernandez, last 12 mths 7/2/2020   Able to walk? -   Fall in past 12 months? No         Abuse Screening Questionnaire 7/2/2020   Do you ever feel afraid of your partner? N   Are you in a relationship with someone who physically or mentally threatens you? N   Is it safe for you to go home? Y       3 most recent PHQ Screens 7/2/2020   Little interest or pleasure in doing things Not at all   Feeling down, depressed, irritable, or hopeless Not at all   Total Score PHQ 2 0       There are no discontinued medications.

## 2020-07-02 NOTE — PATIENT INSTRUCTIONS
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
## 2020-09-28 DIAGNOSIS — I10 ESSENTIAL HYPERTENSION: ICD-10-CM

## 2020-09-28 RX ORDER — DILTIAZEM HYDROCHLORIDE 360 MG/1
CAPSULE, EXTENDED RELEASE ORAL
Qty: 90 CAP | Refills: 2 | Status: SHIPPED | OUTPATIENT
Start: 2020-09-28 | End: 2021-06-26

## 2020-09-28 NOTE — TELEPHONE ENCOUNTER
Requested Prescriptions     Pending Prescriptions Disp Refills    dilTIAZem ER (TIAZAC) 360 mg capsule 90 Cap 0       Last Refill: 6/27/20  Next Appointment:1/4/21

## 2020-11-10 RX ORDER — GLIPIZIDE 10 MG/1
TABLET ORAL
Qty: 360 TAB | Refills: 1 | Status: SHIPPED | OUTPATIENT
Start: 2020-11-10 | End: 2021-05-21 | Stop reason: SDUPTHER

## 2021-05-21 RX ORDER — GLIPIZIDE 10 MG/1
TABLET ORAL
Qty: 360 TABLET | Refills: 0 | Status: SHIPPED | OUTPATIENT
Start: 2021-05-21 | End: 2021-08-10

## 2021-05-21 NOTE — TELEPHONE ENCOUNTER
Last Refill: 11-10-20  Last Visit: 1/4/2021   Next Visit: 6/7/2021     Requested Prescriptions     Pending Prescriptions Disp Refills    glipiZIDE (GLUCOTROL) 10 mg tablet 360 Tablet 0     Sig: TAKE 2 TABLETS BY MOUTH TWICE DAILY

## 2021-06-07 ENCOUNTER — OFFICE VISIT (OUTPATIENT)
Dept: INTERNAL MEDICINE CLINIC | Age: 66
End: 2021-06-07
Payer: MEDICARE

## 2021-06-07 VITALS
DIASTOLIC BLOOD PRESSURE: 66 MMHG | RESPIRATION RATE: 20 BRPM | OXYGEN SATURATION: 98 % | WEIGHT: 214 LBS | TEMPERATURE: 98 F | HEART RATE: 77 BPM | BODY MASS INDEX: 28.99 KG/M2 | HEIGHT: 72 IN | SYSTOLIC BLOOD PRESSURE: 124 MMHG

## 2021-06-07 DIAGNOSIS — E11.319 TYPE 2 DIABETES MELLITUS WITH RETINOPATHY OF BOTH EYES, WITHOUT LONG-TERM CURRENT USE OF INSULIN, MACULAR EDEMA PRESENCE UNSPECIFIED, UNSPECIFIED RETINOPATHY SEVERITY (HCC): Chronic | ICD-10-CM

## 2021-06-07 DIAGNOSIS — E11.40 TYPE 2 DIABETES MELLITUS WITH DIABETIC NEUROPATHY, WITHOUT LONG-TERM CURRENT USE OF INSULIN (HCC): Chronic | ICD-10-CM

## 2021-06-07 DIAGNOSIS — D50.0 CHRONIC BLOOD LOSS ANEMIA: Chronic | ICD-10-CM

## 2021-06-07 DIAGNOSIS — Z00.00 MEDICARE ANNUAL WELLNESS VISIT, SUBSEQUENT: Primary | ICD-10-CM

## 2021-06-07 DIAGNOSIS — I10 ESSENTIAL HYPERTENSION: Chronic | ICD-10-CM

## 2021-06-07 DIAGNOSIS — E27.8 ADRENAL MASS, LEFT (HCC): ICD-10-CM

## 2021-06-07 DIAGNOSIS — I48.0 PAROXYSMAL ATRIAL FIBRILLATION (HCC): Chronic | ICD-10-CM

## 2021-06-07 LAB
ALBUMIN SERPL-MCNC: 3.9 G/DL (ref 3.5–5)
ALBUMIN/GLOB SERPL: 1 {RATIO} (ref 1.1–2.2)
ALP SERPL-CCNC: 141 U/L (ref 45–117)
ALT SERPL-CCNC: 44 U/L (ref 12–78)
ANION GAP SERPL CALC-SCNC: 8 MMOL/L (ref 5–15)
APPEARANCE UR: ABNORMAL
AST SERPL-CCNC: 42 U/L (ref 15–37)
BACTERIA URNS QL MICRO: NEGATIVE /HPF
BASOPHILS # BLD: 0.1 K/UL (ref 0–0.1)
BASOPHILS NFR BLD: 1 % (ref 0–1)
BILIRUB SERPL-MCNC: 0.9 MG/DL (ref 0.2–1)
BILIRUB UR QL: NEGATIVE
BUN SERPL-MCNC: 10 MG/DL (ref 6–20)
BUN/CREAT SERPL: 11 (ref 12–20)
CALCIUM SERPL-MCNC: 9.5 MG/DL (ref 8.5–10.1)
CHLORIDE SERPL-SCNC: 97 MMOL/L (ref 97–108)
CHOLEST SERPL-MCNC: 184 MG/DL
CO2 SERPL-SCNC: 29 MMOL/L (ref 21–32)
COLOR UR: ABNORMAL
CREAT SERPL-MCNC: 0.93 MG/DL (ref 0.7–1.3)
CREAT UR-MCNC: 213 MG/DL
DIFFERENTIAL METHOD BLD: NORMAL
EOSINOPHIL # BLD: 0.2 K/UL (ref 0–0.4)
EOSINOPHIL NFR BLD: 3 % (ref 0–7)
EPITH CASTS URNS QL MICRO: ABNORMAL /LPF
ERYTHROCYTE [DISTWIDTH] IN BLOOD BY AUTOMATED COUNT: 13.3 % (ref 11.5–14.5)
EST. AVERAGE GLUCOSE BLD GHB EST-MCNC: 209 MG/DL
GLOBULIN SER CALC-MCNC: 3.8 G/DL (ref 2–4)
GLUCOSE SERPL-MCNC: 249 MG/DL (ref 65–100)
GLUCOSE UR STRIP.AUTO-MCNC: >1000 MG/DL
HBA1C MFR BLD: 8.9 % (ref 4–5.6)
HCT VFR BLD AUTO: 45.4 % (ref 36.6–50.3)
HDLC SERPL-MCNC: 45 MG/DL
HDLC SERPL: 4.1 {RATIO} (ref 0–5)
HGB BLD-MCNC: 15.5 G/DL (ref 12.1–17)
HGB UR QL STRIP: NEGATIVE
HYALINE CASTS URNS QL MICRO: ABNORMAL /LPF (ref 0–5)
IMM GRANULOCYTES # BLD AUTO: 0 K/UL (ref 0–0.04)
IMM GRANULOCYTES NFR BLD AUTO: 0 % (ref 0–0.5)
KETONES UR QL STRIP.AUTO: ABNORMAL MG/DL
LDLC SERPL CALC-MCNC: 88 MG/DL (ref 0–100)
LEUKOCYTE ESTERASE UR QL STRIP.AUTO: NEGATIVE
LYMPHOCYTES # BLD: 1.4 K/UL (ref 0.8–3.5)
LYMPHOCYTES NFR BLD: 17 % (ref 12–49)
MCH RBC QN AUTO: 32.3 PG (ref 26–34)
MCHC RBC AUTO-ENTMCNC: 34.1 G/DL (ref 30–36.5)
MCV RBC AUTO: 94.6 FL (ref 80–99)
MICROALBUMIN UR-MCNC: 8.66 MG/DL
MICROALBUMIN/CREAT UR-RTO: 41 MG/G (ref 0–30)
MONOCYTES # BLD: 0.6 K/UL (ref 0–1)
MONOCYTES NFR BLD: 8 % (ref 5–13)
NEUTS SEG # BLD: 5.8 K/UL (ref 1.8–8)
NEUTS SEG NFR BLD: 71 % (ref 32–75)
NITRITE UR QL STRIP.AUTO: NEGATIVE
NRBC # BLD: 0 K/UL (ref 0–0.01)
NRBC BLD-RTO: 0 PER 100 WBC
PH UR STRIP: 5.5 [PH] (ref 5–8)
PLATELET # BLD AUTO: 246 K/UL (ref 150–400)
PMV BLD AUTO: 10.6 FL (ref 8.9–12.9)
POTASSIUM SERPL-SCNC: 3.9 MMOL/L (ref 3.5–5.1)
PROT SERPL-MCNC: 7.7 G/DL (ref 6.4–8.2)
PROT UR STRIP-MCNC: ABNORMAL MG/DL
RBC # BLD AUTO: 4.8 M/UL (ref 4.1–5.7)
RBC #/AREA URNS HPF: ABNORMAL /HPF (ref 0–5)
SODIUM SERPL-SCNC: 134 MMOL/L (ref 136–145)
SP GR UR REFRACTOMETRY: 1.03 (ref 1–1.03)
TRIGL SERPL-MCNC: 255 MG/DL (ref ?–150)
TSH SERPL DL<=0.05 MIU/L-ACNC: 2.04 UIU/ML (ref 0.36–3.74)
UA: UC IF INDICATED,UAUC: ABNORMAL
UROBILINOGEN UR QL STRIP.AUTO: 1 EU/DL (ref 0.2–1)
VLDLC SERPL CALC-MCNC: 51 MG/DL
WBC # BLD AUTO: 8.1 K/UL (ref 4.1–11.1)
WBC URNS QL MICRO: ABNORMAL /HPF (ref 0–4)

## 2021-06-07 PROCEDURE — G8510 SCR DEP NEG, NO PLAN REQD: HCPCS | Performed by: INTERNAL MEDICINE

## 2021-06-07 PROCEDURE — G8427 DOCREV CUR MEDS BY ELIG CLIN: HCPCS | Performed by: INTERNAL MEDICINE

## 2021-06-07 PROCEDURE — 99214 OFFICE O/P EST MOD 30 MIN: CPT | Performed by: INTERNAL MEDICINE

## 2021-06-07 PROCEDURE — G0439 PPPS, SUBSEQ VISIT: HCPCS | Performed by: INTERNAL MEDICINE

## 2021-06-07 PROCEDURE — G8536 NO DOC ELDER MAL SCRN: HCPCS | Performed by: INTERNAL MEDICINE

## 2021-06-07 PROCEDURE — G8752 SYS BP LESS 140: HCPCS | Performed by: INTERNAL MEDICINE

## 2021-06-07 PROCEDURE — G8419 CALC BMI OUT NRM PARAM NOF/U: HCPCS | Performed by: INTERNAL MEDICINE

## 2021-06-07 PROCEDURE — 1101F PT FALLS ASSESS-DOCD LE1/YR: CPT | Performed by: INTERNAL MEDICINE

## 2021-06-07 PROCEDURE — G8754 DIAS BP LESS 90: HCPCS | Performed by: INTERNAL MEDICINE

## 2021-06-07 PROCEDURE — 2022F DILAT RTA XM EVC RTNOPTHY: CPT | Performed by: INTERNAL MEDICINE

## 2021-06-07 PROCEDURE — 3017F COLORECTAL CA SCREEN DOC REV: CPT | Performed by: INTERNAL MEDICINE

## 2021-06-07 PROCEDURE — 3046F HEMOGLOBIN A1C LEVEL >9.0%: CPT | Performed by: INTERNAL MEDICINE

## 2021-06-07 NOTE — PROGRESS NOTES
Chief Complaint   Patient presents with    Annual Wellness Visit    Follow Up Chronic Condition       Depression Risk Factor Screening:     3 most recent PHQ Screens 6/7/2021   Little interest or pleasure in doing things Not at all   Feeling down, depressed, irritable, or hopeless Not at all   Total Score PHQ 2 0       Functional Ability and Level of Safety:     Activities of Daily Living  ADL Assessment 6/7/2021   Feeding yourself No Help Needed   Getting from bed to chair No Help Needed   Getting dressed No Help Needed   Bathing or showering No Help Needed   Walk across the room (includes cane/walker) No Help Needed   Using the telphone No Help Needed   Taking your medications No Help Needed   Preparing meals No Help Needed   Managing money (expenses/bills) No Help Needed   Moderately strenuous housework (laundry) No Help Needed   Shopping for personal items (toiletries/medicines) No Help Needed   Shopping for groceries No Help Needed   Driving No Help Needed   Climbing a flight of stairs No Help Needed   Getting to places beyond walking distances No Help Needed       Fall Risk  Fall Risk Assessment, last 12 mths 6/7/2021   Able to walk? Yes   Fall in past 12 months? 0   Do you feel unsteady? 0   Are you worried about falling 0       Abuse Screen  Abuse Screening Questionnaire 6/7/2021   Do you ever feel afraid of your partner? N   Are you in a relationship with someone who physically or mentally threatens you? N   Is it safe for you to go home?  Y         Patient Care Team   Patient Care Team:  Jay Taylor MD as PCP - General (Internal Medicine)  Jay Taylor MD as PCP - [facility]  Trev Olmos MD as Surgeon (General Surgery)  Chandler Vanegas MD (Gastroenterology)
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
## 2021-06-07 NOTE — PATIENT INSTRUCTIONS
Learning About Carbohydrate (Carb) Counting and Eating Out When You Have Diabetes  Why plan your meals? Meal planning can be a key part of managing diabetes. Planning meals and snacks with the right balance of carbohydrate, protein, and fat can help you keep your blood sugar at the target level you set with your doctor. You don't have to eat special foods. You can eat what your family eats, including sweets once in a while. But you do have to pay attention to how often you eat and how much you eat of certain foods. You may want to work with a dietitian or a certified diabetes educator. He or she can give you tips and meal ideas and can answer your questions about meal planning. This health professional can also help you reach a healthy weight if that is one of your goals. What should you know about eating carbs? Managing the amount of carbohydrate (carbs) you eat is an important part of healthy meals when you have diabetes. Carbohydrate is found in many foods. · Learn which foods have carbs. And learn the amounts of carbs in different foods. ? Bread, cereal, pasta, and rice have about 15 grams of carbs in a serving. A serving is 1 slice of bread (1 ounce), ½ cup of cooked cereal, or 1/3 cup of cooked pasta or rice. ? Fruits have 15 grams of carbs in a serving. A serving is 1 small fresh fruit, such as an apple or orange; ½ of a banana; ½ cup of cooked or canned fruit; ½ cup of fruit juice; 1 cup of melon or raspberries; or 2 tablespoons of dried fruit. ? Milk and no-sugar-added yogurt have 15 grams of carbs in a serving. A serving is 1 cup of milk or 2/3 cup of no-sugar-added yogurt. ? Starchy vegetables have 15 grams of carbs in a serving. A serving is ½ cup of mashed potatoes or sweet potato; 1 cup winter squash; ½ of a small baked potato; ½ cup of cooked beans; or ½ cup cooked corn or green peas.   · Learn how much carbs to eat each day and at each meal. A dietitian or CDE can teach you how to keep track of the amount of carbs you eat. This is called carbohydrate counting. · If you are not sure how to count carbohydrate grams, use the Plate Method to plan meals. It is a good, quick way to make sure that you have a balanced meal. It also helps you spread carbs throughout the day. ? Divide your plate by types of foods. Put non-starchy vegetables on half the plate, meat or other protein food on one-quarter of the plate, and a grain or starchy vegetable in the final quarter of the plate. To this you can add a small piece of fruit and 1 cup of milk or yogurt, depending on how many carbs you are supposed to eat at a meal.  · Try to eat about the same amount of carbs at each meal. Do not \"save up\" your daily allowance of carbs to eat at one meal.  · Proteins have very little or no carbs per serving. Examples of proteins are beef, chicken, turkey, fish, eggs, tofu, cheese, cottage cheese, and peanut butter. A serving size of meat is 3 ounces, which is about the size of a deck of cards. Examples of meat substitute serving sizes (equal to 1 ounce of meat) are 1/4 cup of cottage cheese, 1 egg, 1 tablespoon of peanut butter, and ½ cup of tofu. How can you eat out and still eat healthy? · Learn to estimate the serving sizes of foods that have carbohydrate. If you measure food at home, it will be easier to estimate the amount in a serving of restaurant food. · If the meal you order has too much carbohydrate (such as potatoes, corn, or baked beans), ask to have a low-carbohydrate food instead. Ask for a salad or green vegetables. · If you use insulin, check your blood sugar before and after eating out to help you plan how much to eat in the future. · If you eat more carbohydrate at a meal than you had planned, take a walk or do other exercise. This will help lower your blood sugar. What are some tips for eating healthy? · Limit saturated fat, such as the fat from meat and dairy products.  This is a healthy choice because people who have diabetes are at higher risk of heart disease. So choose lean cuts of meat and nonfat or low-fat dairy products. Use olive or canola oil instead of butter or shortening when cooking. · Don't skip meals. Your blood sugar may drop too low if you skip meals and take insulin or certain medicines for diabetes. · Check with your doctor before you drink alcohol. Alcohol can cause your blood sugar to drop too low. Alcohol can also cause a bad reaction if you take certain diabetes medicines. Follow-up care is a key part of your treatment and safety. Be sure to make and go to all appointments, and call your doctor if you are having problems. It's also a good idea to know your test results and keep a list of the medicines you take. Where can you learn more? Go to http://www.solis.com/  Enter I147 in the search box to learn more about \"Learning About Carbohydrate (Carb) Counting and Eating Out When You Have Diabetes. \"  Current as of: August 31, 2020               Content Version: 12.8  © 2006-2021 Healthwise, Incorporated. Care instructions adapted under license by [employer] (which disclaims liability or warranty for this information). If you have questions about a medical condition or this instruction, always ask your healthcare professional. [address] any warranty or liability for your use of this information.

## 2021-06-08 NOTE — PROGRESS NOTES
and A1c up to 8.9 - too high  He needs to check to see if his insurance covers [place] or [facility]

## 2021-06-11 ENCOUNTER — TELEPHONE (OUTPATIENT)
Dept: INTERNAL MEDICINE CLINIC | Age: 66
End: 2021-06-11

## 2021-06-11 DIAGNOSIS — E11.319 TYPE 2 DIABETES MELLITUS WITH RETINOPATHY OF BOTH EYES, WITHOUT LONG-TERM CURRENT USE OF INSULIN, MACULAR EDEMA PRESENCE UNSPECIFIED, UNSPECIFIED RETINOPATHY SEVERITY (HCC): Primary | ICD-10-CM

## 2021-08-10 RX ORDER — GLIPIZIDE 10 MG/1
TABLET ORAL
Qty: 360 TABLET | Refills: 0 | Status: SHIPPED | OUTPATIENT
Start: 2021-08-10 | End: 2021-11-08

## 2021-08-25 DIAGNOSIS — E11.319 TYPE 2 DIABETES MELLITUS WITH RETINOPATHY OF BOTH EYES, WITHOUT LONG-TERM CURRENT USE OF INSULIN, MACULAR EDEMA PRESENCE UNSPECIFIED, UNSPECIFIED RETINOPATHY SEVERITY (HCC): ICD-10-CM

## 2021-08-25 RX ORDER — CANAGLIFLOZIN 300 MG/1
TABLET, FILM COATED ORAL
Qty: 90 TABLET | Refills: 0 | Status: SHIPPED | OUTPATIENT
Start: 2021-08-25 | End: 2021-09-14

## 2021-09-14 DIAGNOSIS — E11.319 TYPE 2 DIABETES MELLITUS WITH RETINOPATHY OF BOTH EYES, WITHOUT LONG-TERM CURRENT USE OF INSULIN, MACULAR EDEMA PRESENCE UNSPECIFIED, UNSPECIFIED RETINOPATHY SEVERITY (HCC): ICD-10-CM

## 2021-09-14 RX ORDER — CANAGLIFLOZIN 300 MG/1
TABLET, FILM COATED ORAL
Qty: 90 TABLET | Refills: 0 | Status: SHIPPED | OUTPATIENT
Start: 2021-09-14 | End: 2021-11-26

## 2021-11-08 RX ORDER — GLIPIZIDE 10 MG/1
TABLET ORAL
Qty: 360 TABLET | Refills: 0 | Status: SHIPPED | OUTPATIENT
Start: 2021-11-08 | End: 2022-02-10

## 2021-11-26 DIAGNOSIS — E11.319 TYPE 2 DIABETES MELLITUS WITH RETINOPATHY OF BOTH EYES, WITHOUT LONG-TERM CURRENT USE OF INSULIN, MACULAR EDEMA PRESENCE UNSPECIFIED, UNSPECIFIED RETINOPATHY SEVERITY (HCC): ICD-10-CM

## 2021-11-26 RX ORDER — CANAGLIFLOZIN 300 MG/1
TABLET, FILM COATED ORAL
Qty: 90 TABLET | Refills: 0 | Status: SHIPPED | OUTPATIENT
Start: 2021-11-26 | End: 2022-01-13 | Stop reason: ALTCHOICE

## 2021-12-08 ENCOUNTER — TELEPHONE (OUTPATIENT)
Dept: INTERNAL MEDICINE CLINIC | Age: 66
End: 2021-12-08

## 2021-12-08 NOTE — TELEPHONE ENCOUNTER
----- Message from Que Fang sent at 12/8/2021  9:15 AM EST -----  Subject: Message to Provider    QUESTIONS  Information for Provider? Pt would to like to inform that he can not make   his appt this morning. Pt also would like a call from Dr. Zeb Quinteros. ---------------------------------------------------------------------------  --------------  [place] INFO  What is the best way for the office to contact you? OK to leave message on   voicemail  Preferred Call Back Phone Number? [phone]  ---------------------------------------------------------------------------  --------------  SCRIPT ANSWERS  Relationship to Patient?  Self

## 2021-12-22 ENCOUNTER — TELEPHONE (OUTPATIENT)
Dept: INTERNAL MEDICINE CLINIC | Age: 66
End: 2021-12-22

## 2021-12-22 NOTE — TELEPHONE ENCOUNTER
Patient advised. Please send pended Rx to pharmacy:  Requested Prescriptions     Pending Prescriptions Disp Refills    empagliflozin (Jardiance) 25 mg tablet 30 Tablet 3     Sig: Take 1 Tablet by mouth daily.

## 2021-12-22 NOTE — TELEPHONE ENCOUNTER
----- Message from Horacio Bains sent at 12/22/2021 11:56 AM EST -----  Subject: Message to Provider    QUESTIONS  Information for Provider? pt would like a phone call about switching his   medication   ---------------------------------------------------------------------------  --------------  CALL BACK INFO  What is the best way for the office to contact you? OK to leave message on   voicemail  Preferred Call Back Phone Number? [phone]  ---------------------------------------------------------------------------  --------------  SCRIPT ANSWERS  Relationship to Patient?  Self

## 2021-12-27 DIAGNOSIS — I10 ESSENTIAL HYPERTENSION: ICD-10-CM

## 2021-12-27 RX ORDER — DILTIAZEM HYDROCHLORIDE 360 MG/1
CAPSULE, EXTENDED RELEASE ORAL
Qty: 90 CAPSULE | Refills: 1 | Status: SHIPPED | OUTPATIENT
Start: 2021-12-27 | End: 2022-08-07 | Stop reason: SDUPTHER

## 2022-01-13 ENCOUNTER — OFFICE VISIT (OUTPATIENT)
Dept: INTERNAL MEDICINE CLINIC | Age: 67
End: 2022-01-13
Payer: MEDICARE

## 2022-01-13 VITALS
RESPIRATION RATE: 20 BRPM | OXYGEN SATURATION: 98 % | HEIGHT: 72 IN | TEMPERATURE: 97.9 F | HEART RATE: 68 BPM | DIASTOLIC BLOOD PRESSURE: 70 MMHG | BODY MASS INDEX: 29.12 KG/M2 | SYSTOLIC BLOOD PRESSURE: 130 MMHG | WEIGHT: 215 LBS

## 2022-01-13 DIAGNOSIS — I10 PRIMARY HYPERTENSION: Chronic | ICD-10-CM

## 2022-01-13 DIAGNOSIS — E11.319 TYPE 2 DIABETES MELLITUS WITH RETINOPATHY OF BOTH EYES, WITHOUT LONG-TERM CURRENT USE OF INSULIN, MACULAR EDEMA PRESENCE UNSPECIFIED, UNSPECIFIED RETINOPATHY SEVERITY (HCC): Chronic | ICD-10-CM

## 2022-01-13 DIAGNOSIS — I48.0 PAROXYSMAL ATRIAL FIBRILLATION (HCC): Chronic | ICD-10-CM

## 2022-01-13 DIAGNOSIS — E11.40 TYPE 2 DIABETES MELLITUS WITH DIABETIC NEUROPATHY, WITHOUT LONG-TERM CURRENT USE OF INSULIN (HCC): Primary | Chronic | ICD-10-CM

## 2022-01-13 DIAGNOSIS — K21.9 GASTROESOPHAGEAL REFLUX DISEASE WITHOUT ESOPHAGITIS: Chronic | ICD-10-CM

## 2022-01-13 PROCEDURE — 3046F HEMOGLOBIN A1C LEVEL >9.0%: CPT | Performed by: INTERNAL MEDICINE

## 2022-01-13 PROCEDURE — G8419 CALC BMI OUT NRM PARAM NOF/U: HCPCS | Performed by: INTERNAL MEDICINE

## 2022-01-13 PROCEDURE — 3017F COLORECTAL CA SCREEN DOC REV: CPT | Performed by: INTERNAL MEDICINE

## 2022-01-13 PROCEDURE — G8536 NO DOC ELDER MAL SCRN: HCPCS | Performed by: INTERNAL MEDICINE

## 2022-01-13 PROCEDURE — G8510 SCR DEP NEG, NO PLAN REQD: HCPCS | Performed by: INTERNAL MEDICINE

## 2022-01-13 PROCEDURE — 1101F PT FALLS ASSESS-DOCD LE1/YR: CPT | Performed by: INTERNAL MEDICINE

## 2022-01-13 PROCEDURE — G8754 DIAS BP LESS 90: HCPCS | Performed by: INTERNAL MEDICINE

## 2022-01-13 PROCEDURE — 99214 OFFICE O/P EST MOD 30 MIN: CPT | Performed by: INTERNAL MEDICINE

## 2022-01-13 PROCEDURE — G8752 SYS BP LESS 140: HCPCS | Performed by: INTERNAL MEDICINE

## 2022-01-13 PROCEDURE — G8427 DOCREV CUR MEDS BY ELIG CLIN: HCPCS | Performed by: INTERNAL MEDICINE

## 2022-01-13 PROCEDURE — 2022F DILAT RTA XM EVC RTNOPTHY: CPT | Performed by: INTERNAL MEDICINE
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
## 2022-01-13 NOTE — PROGRESS NOTES
Janet Palma is a 74 y.o. male presenting for Follow Up Chronic Condition (6 mo fu)  . 1. Have you been to the ER, urgent care clinic since your last visit? Hospitalized since your last visit? No    2. Have you seen or consulted any other health care providers outside of the [address] [place] [address] since your last visit? Include any pap smears or colon screening. No    Fall Risk Assessment, last 12 mths 1/13/2022   Able to walk? Yes   Fall in past 12 months? 0   Do you feel unsteady? 0   Are you worried about falling 0         Abuse Screening Questionnaire 6/7/2021   Do you ever feel afraid of your partner? N   Are you in a relationship with someone who physically or mentally threatens you? N   Is it safe for you to go home? Y       3 most recent PHQ Screens 1/13/2022   Little interest or pleasure in doing things Not at all   Feeling down, depressed, irritable, or hopeless Not at all   Total Score PHQ 2 0       There are no discontinued medications.

## 2022-01-13 NOTE — PATIENT INSTRUCTIONS
Learning About Carbohydrate (Carb) Counting and Eating Out When You Have Diabetes  Why plan your meals? Meal planning can be a key part of managing diabetes. Planning meals and snacks with the right balance of carbohydrate, protein, and fat can help you keep your blood sugar at the target level you set with your doctor. You don't have to eat special foods. You can eat what your family eats, including sweets once in a while. But you do have to pay attention to how often you eat and how much you eat of certain foods. You may want to work with a dietitian or a certified diabetes educator. He or she can give you tips and meal ideas and can answer your questions about meal planning. This health professional can also help you reach a healthy weight if that is one of your goals. What should you know about eating carbs? Managing the amount of carbohydrate (carbs) you eat is an important part of healthy meals when you have diabetes. Carbohydrate is found in many foods. · Learn which foods have carbs. And learn the amounts of carbs in different foods. ? Bread, cereal, pasta, and rice have about 15 grams of carbs in a serving. A serving is 1 slice of bread (1 ounce), ½ cup of cooked cereal, or 1/3 cup of cooked pasta or rice. ? Fruits have 15 grams of carbs in a serving. A serving is 1 small fresh fruit, such as an apple or orange; ½ of a banana; ½ cup of cooked or canned fruit; ½ cup of fruit juice; 1 cup of melon or raspberries; or 2 tablespoons of dried fruit. ? Milk and no-sugar-added yogurt have 15 grams of carbs in a serving. A serving is 1 cup of milk or 2/3 cup of no-sugar-added yogurt. ? Starchy vegetables have 15 grams of carbs in a serving. A serving is ½ cup of mashed potatoes or sweet potato; 1 cup winter squash; ½ of a small baked potato; ½ cup of cooked beans; or ½ cup cooked corn or green peas.   · Learn how much carbs to eat each day and at each meal. A dietitian or CDE can teach you how to keep track of the amount of carbs you eat. This is called carbohydrate counting. · If you are not sure how to count carbohydrate grams, use the Plate Method to plan meals. It is a good, quick way to make sure that you have a balanced meal. It also helps you spread carbs throughout the day. ? Divide your plate by types of foods. Put non-starchy vegetables on half the plate, meat or other protein food on one-quarter of the plate, and a grain or starchy vegetable in the final quarter of the plate. To this you can add a small piece of fruit and 1 cup of milk or yogurt, depending on how many carbs you are supposed to eat at a meal.  · Try to eat about the same amount of carbs at each meal. Do not \"save up\" your daily allowance of carbs to eat at one meal.  · Proteins have very little or no carbs per serving. Examples of proteins are beef, chicken, turkey, fish, eggs, tofu, cheese, cottage cheese, and peanut butter. A serving size of meat is 3 ounces, which is about the size of a deck of cards. Examples of meat substitute serving sizes (equal to 1 ounce of meat) are 1/4 cup of cottage cheese, 1 egg, 1 tablespoon of peanut butter, and ½ cup of tofu. How can you eat out and still eat healthy? · Learn to estimate the serving sizes of foods that have carbohydrate. If you measure food at home, it will be easier to estimate the amount in a serving of restaurant food. · If the meal you order has too much carbohydrate (such as potatoes, corn, or baked beans), ask to have a low-carbohydrate food instead. Ask for a salad or green vegetables. · If you use insulin, check your blood sugar before and after eating out to help you plan how much to eat in the future. · If you eat more carbohydrate at a meal than you had planned, take a walk or do other exercise. This will help lower your blood sugar. What are some tips for eating healthy? · Limit saturated fat, such as the fat from meat and dairy products.  This is a healthy choice because people who have diabetes are at higher risk of heart disease. So choose lean cuts of meat and nonfat or low-fat dairy products. Use olive or canola oil instead of butter or shortening when cooking. · Don't skip meals. Your blood sugar may drop too low if you skip meals and take insulin or certain medicines for diabetes. · Check with your doctor before you drink alcohol. Alcohol can cause your blood sugar to drop too low. Alcohol can also cause a bad reaction if you take certain diabetes medicines. Follow-up care is a key part of your treatment and safety. Be sure to make and go to all appointments, and call your doctor if you are having problems. It's also a good idea to know your test results and keep a list of the medicines you take. Where can you learn more? Go to http://www.solis.com/  Enter I147 in the search box to learn more about \"Learning About Carbohydrate (Carb) Counting and Eating Out When You Have Diabetes. \"  Current as of: December 17, 2020               Content Version: 13.0  © 2006-2021 Healthwise, Incorporated. Care instructions adapted under license by [employer] (which disclaims liability or warranty for this information). If you have questions about a medical condition or this instruction, always ask your healthcare professional. [address] any warranty or liability for your use of this information.

## 2022-01-14 LAB
ALBUMIN SERPL-MCNC: 3.8 G/DL (ref 3.5–5)
ALBUMIN/GLOB SERPL: 1 {RATIO} (ref 1.1–2.2)
ALP SERPL-CCNC: 106 U/L (ref 45–117)
ALT SERPL-CCNC: 27 U/L (ref 12–78)
ANION GAP SERPL CALC-SCNC: 7 MMOL/L (ref 5–15)
APPEARANCE UR: CLEAR
AST SERPL-CCNC: 16 U/L (ref 15–37)
BACTERIA URNS QL MICRO: NEGATIVE /HPF
BASOPHILS # BLD: 0.1 K/UL (ref 0–0.1)
BASOPHILS NFR BLD: 1 % (ref 0–1)
BILIRUB SERPL-MCNC: 0.6 MG/DL (ref 0.2–1)
BILIRUB UR QL: NEGATIVE
BUN SERPL-MCNC: 20 MG/DL (ref 6–20)
BUN/CREAT SERPL: 19 (ref 12–20)
CALCIUM SERPL-MCNC: 10 MG/DL (ref 8.5–10.1)
CHLORIDE SERPL-SCNC: 101 MMOL/L (ref 97–108)
CHOLEST SERPL-MCNC: 143 MG/DL
CO2 SERPL-SCNC: 26 MMOL/L (ref 21–32)
COLOR UR: ABNORMAL
CREAT SERPL-MCNC: 1.03 MG/DL (ref 0.7–1.3)
CREAT UR-MCNC: 53.4 MG/DL
DIFFERENTIAL METHOD BLD: NORMAL
EOSINOPHIL # BLD: 0.2 K/UL (ref 0–0.4)
EOSINOPHIL NFR BLD: 3 % (ref 0–7)
EPITH CASTS URNS QL MICRO: ABNORMAL /LPF
ERYTHROCYTE [DISTWIDTH] IN BLOOD BY AUTOMATED COUNT: 13.2 % (ref 11.5–14.5)
EST. AVERAGE GLUCOSE BLD GHB EST-MCNC: 131 MG/DL
GLOBULIN SER CALC-MCNC: 3.8 G/DL (ref 2–4)
GLUCOSE SERPL-MCNC: 144 MG/DL (ref 65–100)
GLUCOSE UR STRIP.AUTO-MCNC: >1000 MG/DL
HBA1C MFR BLD: 6.2 % (ref 4–5.6)
HCT VFR BLD AUTO: 49.4 % (ref 36.6–50.3)
HDLC SERPL-MCNC: 42 MG/DL
HDLC SERPL: 3.4 {RATIO} (ref 0–5)
HGB BLD-MCNC: 16.8 G/DL (ref 12.1–17)
HGB UR QL STRIP: NEGATIVE
HYALINE CASTS URNS QL MICRO: ABNORMAL /LPF (ref 0–5)
IMM GRANULOCYTES # BLD AUTO: 0 K/UL (ref 0–0.04)
IMM GRANULOCYTES NFR BLD AUTO: 0 % (ref 0–0.5)
KETONES UR QL STRIP.AUTO: NEGATIVE MG/DL
LDLC SERPL CALC-MCNC: 75.6 MG/DL (ref 0–100)
LEUKOCYTE ESTERASE UR QL STRIP.AUTO: NEGATIVE
LYMPHOCYTES # BLD: 1.1 K/UL (ref 0.8–3.5)
LYMPHOCYTES NFR BLD: 14 % (ref 12–49)
MCH RBC QN AUTO: 32 PG (ref 26–34)
MCHC RBC AUTO-ENTMCNC: 34 G/DL (ref 30–36.5)
MCV RBC AUTO: 94.1 FL (ref 80–99)
MICROALBUMIN UR-MCNC: 2.27 MG/DL
MICROALBUMIN/CREAT UR-RTO: 43 MG/G (ref 0–30)
MONOCYTES # BLD: 0.6 K/UL (ref 0–1)
MONOCYTES NFR BLD: 7 % (ref 5–13)
NEUTS SEG # BLD: 6.1 K/UL (ref 1.8–8)
NEUTS SEG NFR BLD: 75 % (ref 32–75)
NITRITE UR QL STRIP.AUTO: NEGATIVE
NRBC # BLD: 0 K/UL (ref 0–0.01)
NRBC BLD-RTO: 0 PER 100 WBC
PH UR STRIP: 5.5 [PH] (ref 5–8)
PLATELET # BLD AUTO: 257 K/UL (ref 150–400)
PMV BLD AUTO: 10 FL (ref 8.9–12.9)
POTASSIUM SERPL-SCNC: 4.2 MMOL/L (ref 3.5–5.1)
PROT SERPL-MCNC: 7.6 G/DL (ref 6.4–8.2)
PROT UR STRIP-MCNC: NEGATIVE MG/DL
RBC # BLD AUTO: 5.25 M/UL (ref 4.1–5.7)
RBC #/AREA URNS HPF: ABNORMAL /HPF (ref 0–5)
SODIUM SERPL-SCNC: 134 MMOL/L (ref 136–145)
SP GR UR REFRACTOMETRY: 1.03 (ref 1–1.03)
TRIGL SERPL-MCNC: 127 MG/DL (ref ?–150)
TSH SERPL DL<=0.05 MIU/L-ACNC: 1.5 UIU/ML (ref 0.36–3.74)
UA: UC IF INDICATED,UAUC: ABNORMAL
UROBILINOGEN UR QL STRIP.AUTO: 1 EU/DL (ref 0.2–1)
VLDLC SERPL CALC-MCNC: 25.4 MG/DL
WBC # BLD AUTO: 8.1 K/UL (ref 4.1–11.1)
WBC URNS QL MICRO: ABNORMAL /HPF (ref 0–4)

## 2022-02-10 RX ORDER — GLIPIZIDE 10 MG/1
TABLET ORAL
Qty: 360 TABLET | Refills: 0 | Status: SHIPPED | OUTPATIENT
Start: 2022-02-10 | End: 2022-03-29

## 2022-03-18 PROBLEM — E11.40 DIABETES MELLITUS WITH NEUROPATHY (HCC): Status: ACTIVE | Noted: 2019-04-04

## 2022-03-18 PROBLEM — E83.42 HYPOMAGNESEMIA: Status: ACTIVE | Noted: 2017-07-27

## 2022-03-18 PROBLEM — N52.9 ED (ERECTILE DYSFUNCTION) OF ORGANIC ORIGIN: Status: ACTIVE | Noted: 2020-07-02

## 2022-03-18 PROBLEM — K21.9 GASTROESOPHAGEAL REFLUX DISEASE WITHOUT ESOPHAGITIS: Status: ACTIVE | Noted: 2022-01-13

## 2022-03-18 PROBLEM — I48.0 PAROXYSMAL ATRIAL FIBRILLATION (HCC): Status: ACTIVE | Noted: 2017-07-27

## 2022-03-18 PROBLEM — Z79.01 ANTICOAGULANT LONG-TERM USE: Status: ACTIVE | Noted: 2017-07-26

## 2022-03-19 PROBLEM — R05.8 ACE-INHIBITOR COUGH: Status: ACTIVE | Noted: 2018-03-22

## 2022-03-19 PROBLEM — T46.4X5A ACE-INHIBITOR COUGH: Status: ACTIVE | Noted: 2018-03-22

## 2022-03-19 PROBLEM — E11.319 TYPE 2 DIABETES MELLITUS WITH RETINOPATHY, WITHOUT LONG-TERM CURRENT USE OF INSULIN (HCC): Status: ACTIVE | Noted: 2017-07-27

## 2022-03-19 PROBLEM — D50.0 CHRONIC BLOOD LOSS ANEMIA: Status: ACTIVE | Noted: 2017-07-26

## 2022-03-19 PROBLEM — Z87.19 HISTORY OF PANCREATITIS: Status: ACTIVE | Noted: 2018-03-22

## 2022-03-19 PROBLEM — K92.1 GASTROINTESTINAL HEMORRHAGE WITH MELENA: Status: ACTIVE | Noted: 2017-07-27

## 2022-03-20 PROBLEM — D50.9 IRON DEFICIENCY ANEMIA: Status: ACTIVE | Noted: 2017-07-26

## 2022-03-20 PROBLEM — I10 HYPERTENSION: Status: ACTIVE | Noted: 2017-11-17

## 2022-03-20 PROBLEM — R19.5 HEME POSITIVE STOOL: Status: ACTIVE | Noted: 2017-07-26

## 2022-03-29 RX ORDER — EMPAGLIFLOZIN 25 MG/1
TABLET, FILM COATED ORAL
Qty: 90 TABLET | Refills: 0 | Status: SHIPPED | OUTPATIENT
Start: 2022-03-29

## 2022-03-29 RX ORDER — GLIPIZIDE 10 MG/1
TABLET ORAL
Qty: 360 TABLET | Refills: 0 | Status: SHIPPED | OUTPATIENT
Start: 2022-03-29 | End: 2022-09-02 | Stop reason: SDUPTHER
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
## 2022-07-25 ENCOUNTER — OFFICE VISIT (OUTPATIENT)
Dept: INTERNAL MEDICINE CLINIC | Age: 67
End: 2022-07-25
Payer: MEDICARE

## 2022-07-25 VITALS
HEART RATE: 70 BPM | SYSTOLIC BLOOD PRESSURE: 132 MMHG | WEIGHT: 211.4 LBS | DIASTOLIC BLOOD PRESSURE: 84 MMHG | RESPIRATION RATE: 20 BRPM | TEMPERATURE: 97.9 F | OXYGEN SATURATION: 96 % | BODY MASS INDEX: 28.63 KG/M2 | HEIGHT: 72 IN

## 2022-07-25 DIAGNOSIS — I10 PRIMARY HYPERTENSION: ICD-10-CM

## 2022-07-25 DIAGNOSIS — I48.0 PAROXYSMAL ATRIAL FIBRILLATION (HCC): ICD-10-CM

## 2022-07-25 DIAGNOSIS — Z00.00 MEDICARE ANNUAL WELLNESS VISIT, SUBSEQUENT: ICD-10-CM

## 2022-07-25 DIAGNOSIS — Z00.00 ROUTINE ADULT HEALTH MAINTENANCE: Primary | ICD-10-CM

## 2022-07-25 DIAGNOSIS — Z12.11 SCREEN FOR COLON CANCER: ICD-10-CM

## 2022-07-25 DIAGNOSIS — E78.49 OTHER HYPERLIPIDEMIA: ICD-10-CM

## 2022-07-25 DIAGNOSIS — E11.40 TYPE 2 DIABETES MELLITUS WITH DIABETIC NEUROPATHY, WITHOUT LONG-TERM CURRENT USE OF INSULIN (HCC): ICD-10-CM

## 2022-07-25 LAB
ALBUMIN SERPL-MCNC: 3.7 G/DL (ref 3.5–5)
ALBUMIN/GLOB SERPL: 1 {RATIO} (ref 1.1–2.2)
ALP SERPL-CCNC: 110 U/L (ref 45–117)
ALT SERPL-CCNC: 24 U/L (ref 12–78)
ANION GAP SERPL CALC-SCNC: 7 MMOL/L (ref 5–15)
AST SERPL-CCNC: 16 U/L (ref 15–37)
BASOPHILS # BLD: 0 K/UL (ref 0–0.1)
BASOPHILS NFR BLD: 1 % (ref 0–1)
BILIRUB SERPL-MCNC: 1.1 MG/DL (ref 0.2–1)
BUN SERPL-MCNC: 18 MG/DL (ref 6–20)
BUN/CREAT SERPL: 15 (ref 12–20)
CALCIUM SERPL-MCNC: 9.7 MG/DL (ref 8.5–10.1)
CHLORIDE SERPL-SCNC: 104 MMOL/L (ref 97–108)
CHOLEST SERPL-MCNC: 158 MG/DL
CO2 SERPL-SCNC: 28 MMOL/L (ref 21–32)
CREAT SERPL-MCNC: 1.17 MG/DL (ref 0.7–1.3)
DIFFERENTIAL METHOD BLD: ABNORMAL
EOSINOPHIL # BLD: 0.1 K/UL (ref 0–0.4)
EOSINOPHIL NFR BLD: 1 % (ref 0–7)
ERYTHROCYTE [DISTWIDTH] IN BLOOD BY AUTOMATED COUNT: 14.6 % (ref 11.5–14.5)
EST. AVERAGE GLUCOSE BLD GHB EST-MCNC: 137 MG/DL
GLOBULIN SER CALC-MCNC: 3.6 G/DL (ref 2–4)
GLUCOSE SERPL-MCNC: 229 MG/DL (ref 65–100)
HBA1C MFR BLD: 6.4 % (ref 4–5.6)
HCT VFR BLD AUTO: 52.2 % (ref 36.6–50.3)
HDLC SERPL-MCNC: 33 MG/DL
HDLC SERPL: 4.8 {RATIO} (ref 0–5)
HGB BLD-MCNC: 17.1 G/DL (ref 12.1–17)
IMM GRANULOCYTES # BLD AUTO: 0 K/UL (ref 0–0.04)
IMM GRANULOCYTES NFR BLD AUTO: 0 % (ref 0–0.5)
LDLC SERPL CALC-MCNC: 73.8 MG/DL (ref 0–100)
LYMPHOCYTES # BLD: 1.9 K/UL (ref 0.8–3.5)
LYMPHOCYTES NFR BLD: 31 % (ref 12–49)
MCH RBC QN AUTO: 31.4 PG (ref 26–34)
MCHC RBC AUTO-ENTMCNC: 32.8 G/DL (ref 30–36.5)
MCV RBC AUTO: 95.8 FL (ref 80–99)
MONOCYTES # BLD: 0.5 K/UL (ref 0–1)
MONOCYTES NFR BLD: 9 % (ref 5–13)
NEUTS SEG # BLD: 3.5 K/UL (ref 1.8–8)
NEUTS SEG NFR BLD: 58 % (ref 32–75)
NRBC # BLD: 0 K/UL (ref 0–0.01)
NRBC BLD-RTO: 0 PER 100 WBC
PLATELET # BLD AUTO: 205 K/UL (ref 150–400)
PMV BLD AUTO: 9.9 FL (ref 8.9–12.9)
POTASSIUM SERPL-SCNC: 4.2 MMOL/L (ref 3.5–5.1)
PROT SERPL-MCNC: 7.3 G/DL (ref 6.4–8.2)
RBC # BLD AUTO: 5.45 M/UL (ref 4.1–5.7)
SODIUM SERPL-SCNC: 139 MMOL/L (ref 136–145)
TRIGL SERPL-MCNC: 256 MG/DL (ref ?–150)
VLDLC SERPL CALC-MCNC: 51.2 MG/DL
WBC # BLD AUTO: 6 K/UL (ref 4.1–11.1)

## 2022-07-25 PROCEDURE — 1123F ACP DISCUSS/DSCN MKR DOCD: CPT | Performed by: INTERNAL MEDICINE

## 2022-07-25 PROCEDURE — G0439 PPPS, SUBSEQ VISIT: HCPCS | Performed by: INTERNAL MEDICINE

## 2022-07-25 PROCEDURE — 3044F HG A1C LEVEL LT 7.0%: CPT | Performed by: INTERNAL MEDICINE

## 2022-07-25 PROCEDURE — 99215 OFFICE O/P EST HI 40 MIN: CPT | Performed by: INTERNAL MEDICINE

## 2022-07-25 RX ORDER — ATORVASTATIN CALCIUM 10 MG/1
10 TABLET, FILM COATED ORAL EVERY OTHER DAY
Qty: 45 TABLET | Refills: 3 | Status: SHIPPED | OUTPATIENT
Start: 2022-07-25 | End: 2023-07-20

## 2022-07-25 NOTE — PROGRESS NOTES
Chief Complaint   Patient presents with    Establish Care         1. \"Have you been to the ER, urgent care clinic since your last visit? Hospitalized since your last visit? \" No    2. \"Have you seen or consulted any other health care providers outside of the [address] [place] [address] since your last visit? \" No     3. For patients aged 39-70: Has the patient had a colonoscopy / FIT/ Cologuard? No      If the patient is female:    4. For patients aged 41-77: Has the patient had a mammogram within the past 2 years? No      5. For patients aged 21-65: Has the patient had a pap smear?  No

## 2022-07-25 NOTE — PATIENT INSTRUCTIONS
Please continue monitoring your blood pressure at home using your home blood pressure monitor. Please record your readings and bring these with you to your next visit. Learning About Carbohydrate (Carb) Counting and Eating Out When You Have Diabetes  Why plan your meals? Meal planning can be a key part of managing diabetes. Planning meals and snacks with the right balance of carbohydrate, protein, and fat can help you keep your blood sugar at the target level you set with your doctor. You don't have to eat special foods. You can eat what your family eats, including sweets once in a while. But you do have to pay attention to how often you eat and how much you eat of certain foods. You may want to work with a dietitian or a diabetes educator. They can give you tips and meal ideas and can answer your questions about meal planning. This health professional can also help you reach a healthy weight if that is one of your goals. What should you know about eating carbs? Managing the amount of carbohydrate (carbs) you eat is an important part of healthy meals when you have diabetes. Carbohydrate is found in many foods. Learn which foods have carbs. And learn the amounts of carbs in different foods. Bread, cereal, pasta, and rice have about 15 grams of carbs in a serving. A serving is 1 slice of bread (1 ounce), ½ cup of cooked cereal, or 1/3 cup of cooked pasta or rice. Fruits have 15 grams of carbs in a serving. A serving is 1 small fresh fruit, such as an apple or orange; ½ of a banana; ½ cup of cooked or canned fruit; ½ cup of fruit juice; 1 cup of melon or raspberries; or 2 tablespoons of dried fruit. Milk and no-sugar-added yogurt have 15 grams of carbs in a serving. A serving is 1 cup of milk or 3/4 cup (6 oz) of no-sugar-added yogurt. Starchy vegetables have 15 grams of carbs in a serving.  A serving is ½ cup of mashed potatoes or sweet potato; 1 cup winter squash; ½ of a small baked potato; ½ cup of cooked beans; or ½ cup cooked corn or green peas. Learn how much carbs to eat each day and at each meal. A dietitian or certified diabetes educator can teach you how to keep track of the amount of carbs you eat. This is called carbohydrate counting. If you are not sure how to count carbohydrate grams, use the plate method to plan meals. It is a quick way to make sure that you have a balanced meal. It also can help you manage the amount of carbohydrate you eat at meals. Divide your plate by types of foods. Put non-starchy vegetables on half the plate, meat or other protein food on one-quarter of the plate, and a grain or starchy vegetable in the final quarter of the plate. To this you can add a small piece of fruit and 1 cup of milk or yogurt, depending on how many carbs you are supposed to eat at a meal.  Try to eat about the same amount of carbs at each meal. Do not \"save up\" your daily allowance of carbs to eat at one meal.  Proteins have very little or no carbs. Examples of proteins are beef, chicken, turkey, fish, eggs, tofu, cheese, cottage cheese, and peanut butter. How can you eat out and still eat healthy? Learn to estimate the serving sizes of foods that have carbohydrate. If you measure food at home, it will be easier to estimate the amount in a serving of restaurant food. If the meal you order has too much carbohydrate (such as potatoes, corn, or baked beans), ask to have a low-carbohydrate food instead. Ask for a salad or non-starchy vegetables like broccoli, cauliflower, green beans, or peppers. If you eat more carbohydrate at a meal than you had planned, take a walk or do other exercise. This will help lower your blood sugar. What are some tips for eating healthy? Limit saturated fat, such as the fat from meat and dairy products. This is a healthy choice because people who have diabetes are at higher risk of heart disease. So choose lean cuts of meat and nonfat or low-fat dairy products. Use olive or canola oil instead of butter or shortening when cooking. Don't skip meals. Your blood sugar may drop too low if you skip meals and take insulin or certain medicines for diabetes. Check with your doctor before you drink alcohol. Alcohol can cause your blood sugar to drop too low. Alcohol can also cause a bad reaction if you take certain diabetes medicines. Follow-up care is a key part of your treatment and safety. Be sure to make and go to all appointments, and call your doctor if you are having problems. It's also a good idea to know your test results and keep a list of the medicines you take. Where can you learn more? Go to http://www.solis.com/  Enter I147 in the search box to learn more about \"Learning About Carbohydrate (Carb) Counting and Eating Out When You Have Diabetes. \"  Current as of: September 8, 2021               Content Version: 13.2  © 2006-2022 [employer]. Care instructions adapted under license by [employer] (which disclaims liability or warranty for this information). If you have questions about a medical condition or this instruction, always ask your healthcare professional. [place] [address] any warranty or liability for your use of this information. Learning About Colonoscopy  What is a colonoscopy? A colonoscopy is a test (also called a procedure) that lets a doctor look inside your large intestine. The doctor uses a thin, lighted tube called a colonoscope. The doctor uses it to look for small growths called polyps, colon or rectal cancer (colorectal cancer), or other problems like bleeding. During the procedure, the doctor can take samples of tissue. The samples can then be checked for cancer or other conditions. The doctor can also take out polyps. How is a colonoscopy done?   This procedure is done in a doctor's office or a clinic or hospital. You will get medicine to help you relax and not feel pain. Some people find that they don't remember having the test because of the medicine. The doctor gently moves the colonoscope, or scope, through the colon. The scope is also a small video camera. It lets the doctor see the colon and take pictures. How do you prepare for the procedure? You need to clean out your colon before the procedure so the doctor can see your colon. This depends on which \"colon prep\" your doctor recommends. To clean out your colon, you'll do a \"colon prep\" before the test. This means you stop eating solid foods and drink only clear liquids. You can have water, tea, coffee, clear juices, clear broths, flavored ice pops, and gelatin (such as Jell-O). Do not drink anything red or purple. The day or night before the procedure, you drink a large amount of a special liquid. This causes loose, frequent stools. You will go to the bathroom a lot. Your doctor may have you drink part of the liquid the evening before and the rest on the day of the test. It's very important to drink all of the liquid. If you have problems drinking it, call your doctor. Arrange to have someone take you home after the test.  What can you expect after a colonoscopy? Your doctor will tell you when you can eat and do your usual activities. Drink a lot of fluid after the test to replace the fluids you may have lost during the colon prep. But don't drink alcohol. Your doctor will talk to you about when you'll need your next colonoscopy. The results of your test and your risk for colorectal cancer will help your doctor decide how often you need to be checked. After the test, you may be bloated or have gas pains. You may need to pass gas. If a biopsy was done or a polyp was removed, you may have streaks of blood in your stool (feces) for a few days. Check with your doctor to see when it is safe to take aspirin and nonsteroidal anti-inflammatory drugs (NSAIDs) again.   Problems such as heavy rectal bleeding may not occur until several weeks after the test. This isn't common. But it can happen after polyps are removed. Follow-up care is a key part of your treatment and safety. Be sure to make and go to all appointments, and call your doctor if you are having problems. It's also a good idea to know your test results and keep a list of the medicines you take. Where can you learn more? Go to http://www.gray.com/  Enter Z368 in the search box to learn more about \"Learning About Colonoscopy. \"  Current as of: September 8, 2021               Content Version: 13.2  © 2006-2022 [employer]. Care instructions adapted under license by [employer] (which disclaims liability or warranty for this information). If you have questions about a medical condition or this instruction, always ask your healthcare professional. [address] any warranty or liability for your use of this information. Diabetes Foot Health: Care Instructions  Your Care Instructions     When you have diabetes, your feet need extra care and attention. Diabetes can damage the nerve endings and blood vessels in your feet, making you less likely to notice when your feet are injured. Diabetes also limits your body's ability to fight infection and get blood to areas that need it. If you get a minor foot injury, it could become an ulcer or a serious infection. With good foot care, you can prevent most of these problems. Caring for your feet can be quick and easy. Most of the care can be done when you are bathing or getting ready for bed. Follow-up care is a key part of your treatment and safety. Be sure to make and go to all appointments, and call your doctor if you are having problems. It's also a good idea to know your test results and keep a list of the medicines you take. How can you care for yourself at home?   Keep your blood sugar close to normal by watching what and how much you eat, monitoring blood sugar, taking medicines if prescribed, and getting regular exercise. Do not smoke. Smoking affects blood flow and can make foot problems worse. If you need help quitting, talk to your doctor about stop-smoking programs and medicines. These can increase your chances of quitting for good. Eat a diet that is low in fats. High fat intake can cause fat to build up in your blood vessels and decrease blood flow. Inspect your feet daily for blisters, cuts, cracks, or sores. If you cannot see well, use a mirror or have someone help you. Take care of your feet:  Wash your feet every day. Use warm (not hot) water. Check the water temperature with your wrists or other part of your body, not your feet. Dry your feet well. Pat them dry. Do not rub the skin on your feet too hard. Dry well between your toes. If the skin on your feet stays moist, bacteria or a fungus can grow, which can lead to infection. Keep your skin soft. Use moisturizing skin cream to keep the skin on your feet soft and prevent calluses and cracks. But do not put the cream between your toes, and stop using any cream that causes a rash. Clean underneath your toenails carefully. Do not use a sharp object to clean underneath your toenails. Use the blunt end of a nail file or other rounded tool. Trim and file your toenails straight across to prevent ingrown toenails. Use a nail clipper, not scissors. Use an emery board to smooth the edges. Change socks daily. Socks without seams are best, because seams often rub the feet. You can find socks for people with diabetes from specialty catalogs. Look inside your shoes every day for things like gravel or torn linings, which could cause blisters or sores. Buy shoes that fit well:  Look for shoes that have plenty of space around the toes. This helps prevent bunions and blisters. Try on shoes while wearing the kind of socks you will usually wear with the shoes. Avoid plastic shoes.  They may rub your feet and cause blisters. Good shoes should be made of materials that are flexible and breathable, such as leather or cloth. Break in new shoes slowly by wearing them for no more than an hour a day for several days. Take extra time to check your feet for red areas, blisters, or other problems after you wear new shoes. Do not go barefoot. Do not wear sandals, and do not wear shoes with very thin soles. Thin soles are easy to puncture. They also do not protect your feet from hot pavement or cold weather. Have your doctor check your feet during each visit. If you have a foot problem, see your doctor. Do not try to treat an early foot problem at home. Home remedies or treatments that you can buy without a prescription (such as corn removers) can be harmful. Always get early treatment for foot problems. A minor irritation can lead to a major problem if not properly cared for early. When should you call for help? Call your doctor now or seek immediate medical care if:    You have a foot sore, an ulcer or break in the skin that is not healing after 4 days, bleeding corns or calluses, or an ingrown toenail. You have blue or black areas, which can mean bruising or blood flow problems. You have peeling skin or tiny blisters between your toes or cracking or oozing of the skin. You have a fever for more than 24 hours and a foot sore. You have new numbness or tingling in your feet that does not go away after you move your feet or change positions. You have unexplained or unusual swelling of the foot or ankle. Watch closely for changes in your health, and be sure to contact your doctor if:    You cannot do proper foot care. Where can you learn more? Go to http://www.gray.com/  Enter A739 in the search box to learn more about \"Diabetes Foot Health: Care Instructions. \"  Current as of: July 28, 2021               Content Version: 13.2  © 7152-9870 [employer]. Care instructions adapted under license by [employer] (which disclaims liability or warranty for this information). If you have questions about a medical condition or this instruction, always ask your healthcare professional. [address] any warranty or liability for your use of this information. Medicare Wellness Visit, Male    The best way to live healthy is to have a lifestyle where you eat a well-balanced diet, exercise regularly, limit alcohol use, and quit all forms of tobacco/nicotine, if applicable. Regular preventive services are another way to keep healthy. Preventive services (vaccines, screening tests, monitoring & exams) can help personalize your care plan, which helps you manage your own care. Screening tests can find health problems at the earliest stages, when they are easiest to treat. [facility] follows the current, evidence-based guidelines published by the [facility] ([facility]) when recommending preventive services for our patients. Because we follow these guidelines, sometimes recommendations change over time as research supports it. (For example, a prostate screening blood test is no longer routinely recommended for men with no symptoms). Of course, you and your doctor may decide to screen more often for some diseases, based on your risk and co-morbidities (chronic disease you are already diagnosed with). Preventive services for you include:  - Medicare offers their members a free annual wellness visit, which is time for you and your primary care provider to discuss and plan for your preventive service needs. Take advantage of this benefit every year!  -All adults over age 72 should receive the recommended pneumonia vaccines.  Current USPSTF guidelines recommend a series of two vaccines for the best pneumonia protection.   -All adults should have a flu vaccine yearly and tetanus vaccine every 10 years.  -All adults age 48 and older should receive the shingles vaccines (series of two vaccines). -All adults age 38-68 who are overweight should have a diabetes screening test once every three years.   -Other screening tests & preventive services for persons with diabetes include: an eye exam to screen for diabetic retinopathy, a kidney function test, a foot exam, and stricter control over your cholesterol.   -Cardiovascular screening for adults with routine risk involves an electrocardiogram (ECG) at intervals determined by the provider.   -Colorectal cancer screening should be done for adults age 54-65 with no increased risk factors for colorectal cancer. There are a number of acceptable methods of screening for this type of cancer. Each test has its own benefits and drawbacks. Discuss with your provider what is most appropriate for you during your annual wellness visit. The different tests include: colonoscopy (considered the best screening method), a fecal occult blood test, a fecal DNA test, and sigmoidoscopy.  -All adults born between [facility] should be screened once for Hepatitis C.  -An Abdominal Aortic Aneurysm (AAA) Screening is recommended for men age 73-68 who has ever smoked in their lifetime.      Here is a list of your current Health Maintenance items (your personalized list of preventive services) with a due date:  Health Maintenance Due   Topic Date Due    COVID-19 Vaccine (1) Never done    DTaP/Tdap/Td  (1 - Tdap) Never done    Shingles Vaccine (1 of 2) Never done    Diabetic Foot Care  04/04/2020    Pneumococcal Vaccine (3 - PPSV23 or PCV20) 10/03/2020    Colorectal Screening  08/04/2022

## 2022-08-07 DIAGNOSIS — I10 ESSENTIAL HYPERTENSION: ICD-10-CM

## 2022-08-07 RX ORDER — DILTIAZEM HYDROCHLORIDE 360 MG/1
CAPSULE, EXTENDED RELEASE ORAL
Qty: 90 CAPSULE | Refills: 1 | Status: SHIPPED | OUTPATIENT
Start: 2022-08-07

## 2022-09-02 RX ORDER — GLIPIZIDE 10 MG/1
20 TABLET ORAL 2 TIMES DAILY
Qty: 360 TABLET | Refills: 3 | Status: SHIPPED | OUTPATIENT
Start: 2022-09-02

## 2022-09-02 NOTE — TELEPHONE ENCOUNTER
PCP: Rose Santos MD    Last appt: 7/25/2022  Future Appointments   Date Time Provider [place]   1/25/2023 11:15 AM Rose Santos MD [facility] BS AMB       Requested Prescriptions     Pending Prescriptions Disp Refills    glipiZIDE (GLUCOTROL) 10 mg tablet 360 Tablet 0     Sig: Take 2 Tablets by mouth two (2) times a day.        Prior labs and Blood pressures:  BP Readings from Last 3 Encounters:   07/25/22 132/84   01/13/22 130/70   06/07/21 124/66     Lab Results   Component Value Date/Time    Sodium 139 07/25/2022 12:05 PM    Potassium 4.2 07/25/2022 12:05 PM    Chloride 104 07/25/2022 12:05 PM    CO2 28 07/25/2022 12:05 PM    Anion gap 7 07/25/2022 12:05 PM    Glucose 229 (H) 07/25/2022 12:05 PM    BUN 18 07/25/2022 12:05 PM    Creatinine 1.17 07/25/2022 12:05 PM    BUN/Creatinine ratio 15 07/25/2022 12:05 PM    GFR est AA >60 07/25/2022 12:05 PM    GFR est non-AA >60 07/25/2022 12:05 PM    Calcium 9.7 07/25/2022 12:05 PM     Lab Results   Component Value Date/Time    Hemoglobin A1c 6.4 (H) 07/25/2022 12:05 PM    Hemoglobin A1c (POC) 9.5 (A) 11/17/2017 11:46 AM     Lab Results   Component Value Date/Time    Cholesterol, total 158 07/25/2022 12:05 PM    Cholesterol (POC) 134.0 11/17/2017 11:46 AM    HDL Cholesterol 33 07/25/2022 12:05 PM    HDL Cholesterol (POC) 40.0 11/17/2017 11:46 AM    LDL Cholesterol (POC) 75.4 11/17/2017 11:46 AM    LDL, calculated 73.8 07/25/2022 12:05 PM    VLDL, calculated 51.2 07/25/2022 12:05 PM    Triglyceride 256 (H) 07/25/2022 12:05 PM    Triglycerides (POC) 93.0 11/17/2017 11:46 AM    CHOL/HDL Ratio 4.8 07/25/2022 12:05 PM     Lab Results   Component Value Date/Time    Vitamin D 25-Hydroxy 26.7 (L) 10/20/2014 04:05 PM       Lab Results   Component Value Date/Time    TSH 1.50 01/13/2022 11:47 AM    TSH, 3rd generation 0.84 07/02/2020 11:37 AM

## 2022-11-22 DIAGNOSIS — I10 ESSENTIAL HYPERTENSION: ICD-10-CM

## 2022-11-22 RX ORDER — DILTIAZEM HYDROCHLORIDE 360 MG/1
CAPSULE, EXTENDED RELEASE ORAL
Qty: 90 CAPSULE | Refills: 1 | Status: SHIPPED | OUTPATIENT
Start: 2022-11-22

## 2023-01-25 ENCOUNTER — OFFICE VISIT (OUTPATIENT)
Dept: INTERNAL MEDICINE CLINIC | Age: 68
End: 2023-01-25
Payer: MEDICARE

## 2023-01-25 VITALS
BODY MASS INDEX: 29.17 KG/M2 | WEIGHT: 215.4 LBS | DIASTOLIC BLOOD PRESSURE: 78 MMHG | RESPIRATION RATE: 20 BRPM | HEIGHT: 72 IN | HEART RATE: 73 BPM | SYSTOLIC BLOOD PRESSURE: 130 MMHG | TEMPERATURE: 97.9 F | OXYGEN SATURATION: 98 %

## 2023-01-25 DIAGNOSIS — Z23 ENCOUNTER FOR IMMUNIZATION: ICD-10-CM

## 2023-01-25 DIAGNOSIS — I10 ESSENTIAL HYPERTENSION: ICD-10-CM

## 2023-01-25 DIAGNOSIS — Z12.11 SCREENING FOR COLON CANCER: ICD-10-CM

## 2023-01-25 DIAGNOSIS — E78.49 OTHER HYPERLIPIDEMIA: ICD-10-CM

## 2023-01-25 DIAGNOSIS — Z00.00 ROUTINE ADULT HEALTH MAINTENANCE: Primary | ICD-10-CM

## 2023-01-25 DIAGNOSIS — I48.0 PAROXYSMAL ATRIAL FIBRILLATION (HCC): ICD-10-CM

## 2023-01-25 DIAGNOSIS — E11.40 TYPE 2 DIABETES MELLITUS WITH DIABETIC NEUROPATHY, WITHOUT LONG-TERM CURRENT USE OF INSULIN (HCC): ICD-10-CM

## 2023-01-25 PROCEDURE — 90677 PCV20 VACCINE IM: CPT | Performed by: INTERNAL MEDICINE

## 2023-01-25 PROCEDURE — 2022F DILAT RTA XM EVC RTNOPTHY: CPT | Performed by: INTERNAL MEDICINE

## 2023-01-25 PROCEDURE — 3017F COLORECTAL CA SCREEN DOC REV: CPT | Performed by: INTERNAL MEDICINE

## 2023-01-25 PROCEDURE — 99214 OFFICE O/P EST MOD 30 MIN: CPT | Performed by: INTERNAL MEDICINE

## 2023-01-25 PROCEDURE — 1101F PT FALLS ASSESS-DOCD LE1/YR: CPT | Performed by: INTERNAL MEDICINE

## 2023-01-25 PROCEDURE — G0009 ADMIN PNEUMOCOCCAL VACCINE: HCPCS | Performed by: INTERNAL MEDICINE

## 2023-01-25 PROCEDURE — G8427 DOCREV CUR MEDS BY ELIG CLIN: HCPCS | Performed by: INTERNAL MEDICINE

## 2023-01-25 PROCEDURE — G8417 CALC BMI ABV UP PARAM F/U: HCPCS | Performed by: INTERNAL MEDICINE

## 2023-01-25 PROCEDURE — 3078F DIAST BP <80 MM HG: CPT | Performed by: INTERNAL MEDICINE

## 2023-01-25 PROCEDURE — 1123F ACP DISCUSS/DSCN MKR DOCD: CPT | Performed by: INTERNAL MEDICINE

## 2023-01-25 PROCEDURE — G8510 SCR DEP NEG, NO PLAN REQD: HCPCS | Performed by: INTERNAL MEDICINE

## 2023-01-25 PROCEDURE — 3046F HEMOGLOBIN A1C LEVEL >9.0%: CPT | Performed by: INTERNAL MEDICINE

## 2023-01-25 PROCEDURE — 3075F SYST BP GE 130 - 139MM HG: CPT | Performed by: INTERNAL MEDICINE

## 2023-01-25 PROCEDURE — G8536 NO DOC ELDER MAL SCRN: HCPCS | Performed by: INTERNAL MEDICINE

## 2023-01-25 NOTE — PATIENT INSTRUCTIONS
Pneumococcal conjugate vaccine (20-Valent) (PCV20): Patient drug information      Copyright 2740-9755 [facility] 240 rights reserved. Contributor Disclosures  (For additional information see \"Pneumococcal conjugate vaccine (20-Valent) (PCV20): Drug information\")    You must carefully read the \"Consumer Information Use and Disclaimer\" below in order to understand and correctly use this information. Brand Names: [place]  Prevnar 20    What is this drug used for? It is used to prevent infections caused by Streptococcus pneumoniae. What do I need to tell my doctor BEFORE I take this drug? If you are allergic to this drug; any part of this drug; or any other drugs, foods, or substances. Tell your doctor about the allergy and what signs you had. This drug may interact with other drugs or health problems. Tell your doctor and pharmacist about all of your drugs (prescription or OTC, natural products, vitamins) and health problems. You must check to make sure that it is safe for you to take this drug with all of your drugs and health problems. Do not start, stop, or change the dose of any drug without checking with your doctor. What are some things I need to know or do while I take this drug? Tell all of your health care providers that you take this drug. This includes your doctors, nurses, pharmacists, and dentists. Like all vaccines, this vaccine may not fully protect all people who get it. If you have questions, talk with the doctor. If you have a weak immune system or take drugs that weaken the immune system, talk with your doctor. This vaccine may not work as well. Tell your doctor if you are pregnant, plan on getting pregnant, or are breast-feeding. You will need to talk about the benefits and risks to you and the baby. What are some side effects that I need to call my doctor about right away?    WARNING/CAUTION: Even though it may be rare, some people may have very bad and sometimes deadly side effects when taking a drug. Tell your doctor or get medical help right away if you have any of the following signs or symptoms that may be related to a very bad side effect:   Signs of an allergic reaction, like rash; hives; itching; red, swollen, blistered, or peeling skin with or without fever; wheezing; tightness in the chest or throat; trouble breathing, swallowing, or talking; unusual hoarseness; or swelling of the mouth, face, lips, tongue, or throat. What are some other side effects of this drug? All drugs may cause side effects. However, many people have no side effects or only have minor side effects. Call your doctor or get medical help if any of these side effects or any other side effects bother you or do not go away:   Pain, redness, or swelling where the shot was given. Feeling very tired or weak. Headache. Muscle or joint pain. These are not all of the side effects that may occur. If you have questions about side effects, call your doctor. Call your doctor for medical advice about side effects. You may report side effects to your national health agency. How is this drug best taken? Use this drug as ordered by your doctor. Read all information given to you. Follow all instructions closely. It is given as a shot into a muscle. How do I store and/or throw out this drug? If you need to store this drug at home, talk with your doctor, nurse, or pharmacist about how to store it. General drug facts   If your symptoms or health problems do not get better or if they become worse, call your doctor. Do not share your drugs with others and do not take anyone else's drugs. Keep all drugs in a safe place. Keep all drugs out of the reach of children and pets. Throw away unused or  drugs. Do not flush down a toilet or pour down a drain unless you are told to do so. Check with your pharmacist if you have questions about the best way to throw out drugs.  There may be drug take-back programs in your area. Some drugs may have another patient information leaflet. If you have any questions about this drug, please talk with your doctor, nurse, pharmacist, or other health care provider. If you think there has been an overdose, call your poison control center or get medical care right away. Be ready to tell or show what was taken, how much, and when it happened. CDC Information   Vaccine Information Statements (VIS) are made by the staff of the Centers for Disease Control and Prevention (CDC). Each VIS gives information to properly inform the adult receiving the vaccine or, in the case of a minor, the child's parent or legal representative about the risks and benefits of each vaccine. Before a doctor vaccinates a child or an adult, the provider is required by the National Childhood Vaccine Injury Act to give a copy of the VIS. You can also get foreign language versions. Last Reviewed Date2021-06-15  Consumer Information Use and Disclaimer   This generalized information is a limited summary of diagnosis, treatment, and/or medication information. It is not meant to be comprehensive and should be used as a tool to help the user understand and/or assess potential diagnostic and treatment options. It does NOT include all information about conditions, treatments, medications, side effects, or risks that may apply to a specific patient. It is not intended to be medical advice or a substitute for the medical advice, diagnosis, or treatment of a health care provider based on the health care provider's examination and assessment of a patient's specific and unique circumstances. Patients must speak with a health care provider for complete information about their health, medical questions, and treatment options, including any risks or benefits regarding use of medications.  This information does not endorse any treatments or medications as safe, effective, or approved for treating a specific patient. Up[employer], Inc. and its affiliates disclaim any warranty or liability relating to this information or the use thereof. The use of this information is governed by the Terms of Use, available at [employer]. com/en/know/clinical-effectiveness-terms. © 2022 [employer], Inc. and its affiliates and/or [facility]. All rights reserved. Learning About Colonoscopy  What is a colonoscopy? A colonoscopy is a test (also called a procedure) that lets a doctor look inside your large intestine. The doctor uses a thin, lighted tube called a colonoscope. The doctor uses it to look for small growths called polyps, colon or rectal cancer (colorectal cancer), or other problems like bleeding. During the procedure, the doctor can take samples of tissue. The samples can then be checked for cancer or other conditions. The doctor can also take out polyps. How is a colonoscopy done? This procedure is done in a doctor's office or a clinic or hospital. You will get medicine to help you relax and not feel pain. Some people find that they don't remember having the test because of the medicine. The doctor gently moves the colonoscope, or scope, through the colon. The scope is also a small video camera. It lets the doctor see the colon and take pictures. How do you prepare for the procedure? You need to clean out your colon before the procedure so the doctor can see your colon. This depends on which \"colon prep\" your doctor recommends. To clean out your colon, you'll do a \"colon prep\" before the test. This means you stop eating solid foods and drink only clear liquids. You can have water, tea, coffee, clear juices, clear broths, flavored ice pops, and gelatin (such as Jell-O). Do not drink anything red or purple. The day or night before the procedure, you drink a large amount of a special liquid. This causes loose, frequent stools. You will go to the bathroom a lot.  Your doctor may have you drink part of the liquid the evening before and the rest on the day of the test. It's very important to drink all of the liquid. If you have problems drinking it, call your doctor. Arrange to have someone take you home after the test.  What can you expect after a colonoscopy? Your doctor will tell you when you can eat and do your usual activities. Drink a lot of fluid after the test to replace the fluids you may have lost during the colon prep. But don't drink alcohol. Your doctor will talk to you about when you'll need your next colonoscopy. The results of your test and your risk for colorectal cancer will help your doctor decide how often you need to be checked. After the test, you may be bloated or have gas pains. You may need to pass gas. If a biopsy was done or a polyp was removed, you may have streaks of blood in your stool (feces) for a few days. Check with your doctor to see when it is safe to take aspirin and nonsteroidal anti-inflammatory drugs (NSAIDs) again. Problems such as heavy rectal bleeding may not occur until several weeks after the test. This isn't common. But it can happen after polyps are removed. Follow-up care is a key part of your treatment and safety. Be sure to make and go to all appointments, and call your doctor if you are having problems. It's also a good idea to know your test results and keep a list of the medicines you take. Where can you learn more? Go to http://www.gray.com/  Enter Z368 in the search box to learn more about \"Learning About Colonoscopy. \"  Current as of: May 4, 2022               Content Version: 13.4  © 2006-2022 [employer]. Care instructions adapted under license by [employer] (which disclaims liability or warranty for this information).  If you have questions about a medical condition or this instruction, always ask your healthcare professional. [address] any warranty or liability for your use of this information.

## 2023-01-25 NOTE — PROGRESS NOTES
Chief Complaint   Patient presents with    Follow-up       1. \"Have you been to the ER, urgent care clinic since your last visit? Hospitalized since your last visit? \" No    2. \"Have you seen or consulted any other health care providers outside of the [address] [place] [address] since your last visit? \" No     3. For patients aged 39-70: Has the patient had a colonoscopy / FIT/ Cologuard? No      If the patient is female:    4. For patients aged 41-77: Has the patient had a mammogram within the past 2 years? No      5. For patients aged 21-65: Has the patient had a pap smear?  No

## 2023-01-25 NOTE — PROGRESS NOTES
After obtaining written consent and per orders of Dr. Izaiah Anderson, injection of [id number] given by Peri Ho CMA. Order and injection/medication verified by Jw Mcgill. Patient tolerated procedure well. VIS was given to them. No reactions noted.
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
## 2023-01-26 LAB
ALBUMIN SERPL-MCNC: 3.7 G/DL (ref 3.5–5)
ALBUMIN/GLOB SERPL: 1.2 (ref 1.1–2.2)
ALP SERPL-CCNC: 120 U/L (ref 45–117)
ALT SERPL-CCNC: 34 U/L (ref 12–78)
ANION GAP SERPL CALC-SCNC: 4 MMOL/L (ref 5–15)
AST SERPL-CCNC: 21 U/L (ref 15–37)
BILIRUB SERPL-MCNC: 0.8 MG/DL (ref 0.2–1)
BUN SERPL-MCNC: 18 MG/DL (ref 6–20)
BUN/CREAT SERPL: 16 (ref 12–20)
CALCIUM SERPL-MCNC: 9.6 MG/DL (ref 8.5–10.1)
CHLORIDE SERPL-SCNC: 101 MMOL/L (ref 97–108)
CHOLEST SERPL-MCNC: 151 MG/DL
CO2 SERPL-SCNC: 29 MMOL/L (ref 21–32)
CREAT SERPL-MCNC: 1.1 MG/DL (ref 0.7–1.3)
CREAT UR-MCNC: 92 MG/DL
EST. AVERAGE GLUCOSE BLD GHB EST-MCNC: 183 MG/DL
GLOBULIN SER CALC-MCNC: 3.2 G/DL (ref 2–4)
GLUCOSE SERPL-MCNC: 327 MG/DL (ref 65–100)
HBA1C MFR BLD: 8 % (ref 4–5.6)
HDLC SERPL-MCNC: 35 MG/DL
HDLC SERPL: 4.3 (ref 0–5)
LDLC SERPL CALC-MCNC: 72.2 MG/DL (ref 0–100)
MICROALBUMIN UR-MCNC: 2.52 MG/DL
MICROALBUMIN/CREAT UR-RTO: 27 MG/G (ref 0–30)
POTASSIUM SERPL-SCNC: 4.1 MMOL/L (ref 3.5–5.1)
PROT SERPL-MCNC: 6.9 G/DL (ref 6.4–8.2)
SODIUM SERPL-SCNC: 134 MMOL/L (ref 136–145)
TRIGL SERPL-MCNC: 219 MG/DL (ref ?–150)
VLDLC SERPL CALC-MCNC: 43.8 MG/DL

## 2023-06-05 NOTE — TELEPHONE ENCOUNTER
Requested Prescriptions     Pending Prescriptions Disp Refills    dilTIAZem (TIAZAC) 360 MG extended release capsule [Pharmacy Med Name: DILTIAZEM ER 360MG CAPSULES (24 HR)] 90 capsule 3     Sig: TAKE 1 CAPSULE BY MOUTH EVERY DAY       RX refill request from the patient/pharmacy. Patient last seen 1/25/23 with labs, and next appt. scheduled for 7/31/23.

## 2023-06-06 RX ORDER — DILTIAZEM HYDROCHLORIDE 360 MG/1
360 CAPSULE, EXTENDED RELEASE ORAL DAILY
Qty: 90 CAPSULE | Refills: 0 | Status: SHIPPED | OUTPATIENT
Start: 2023-06-06

## 2023-08-31 NOTE — TELEPHONE ENCOUNTER
PCP: Maki Carbajal MD    Last appt: 1/25/2023  No future appointments.     Requested Prescriptions     Pending Prescriptions Disp Refills    glipiZIDE (GLUCOTROL) 10 MG tablet 60 tablet      Sig: Take by mouth 2 times daily       Prior labs and Blood pressures:  BP Readings from Last 3 Encounters:   01/25/23 130/78   07/25/22 132/84   01/13/22 130/70     Lab Results   Component Value Date/Time     01/25/2023 12:20 PM    K 4.1 01/25/2023 12:20 PM     01/25/2023 12:20 PM    CO2 29 01/25/2023 12:20 PM    BUN 18 01/25/2023 12:20 PM    GFRAA >60 07/25/2022 12:05 PM     No results found for: HBA1C, [id number]  Lab Results   Component Value Date/Time    CHOL 151 01/25/2023 12:20 PM    HDL 35 01/25/2023 12:20 PM    VLDL 31 07/02/2020 11:36 AM     No results found for: VITD3, VD3RIA        Lab Results   Component Value Date/Time    TSH 1.50 01/13/2022 11:47 AM

## 2023-08-31 NOTE — TELEPHONE ENCOUNTER
Pharmacy: [facility] [id number] [place]    glipiZIDE (GLUCOTROL) 10 mg tablet 360 Tablet 3 9/2/2022     Sig - Route: Take 2 Tablets by mouth two (2) times a day.  - Oral

## 2023-08-31 NOTE — TELEPHONE ENCOUNTER
PCP: Christal Saucedo MD    Last appt: 1/25/2023    No future appointments.     Requested Prescriptions     Pending Prescriptions Disp Refills    dilTIAZem (TIAZAC) 360 MG extended release capsule [Pharmacy Med Name: DILTIAZEM ER 360MG CAPSULES (24 HR)] 30 capsule 0     Sig: TAKE ONE CAPSULE BY MOUTH DAILY

## 2023-09-02 RX ORDER — DILTIAZEM HYDROCHLORIDE 360 MG/1
CAPSULE, EXTENDED RELEASE ORAL
Qty: 90 CAPSULE | Refills: 1 | Status: SHIPPED | OUTPATIENT
Start: 2023-09-02

## 2023-09-02 RX ORDER — GLIPIZIDE 10 MG/1
10 TABLET ORAL 2 TIMES DAILY
Qty: 60 TABLET | Refills: 1 | Status: SHIPPED | OUTPATIENT
Start: 2023-09-02 | End: 2023-09-08

## 2023-09-06 NOTE — TELEPHONE ENCOUNTER
PCP: Dre Mai MD    Last appt: Visit date not found  No future appointments.     Requested Prescriptions     Pending Prescriptions Disp Refills    glipiZIDE (GLUCOTROL) 10 MG tablet [Pharmacy Med Name: GLIPIZIDE 10MG TABLETS] 180 tablet      Sig: TAKE 1 TABLET BY MOUTH TWICE DAILY       Prior labs and Blood pressures:  BP Readings from Last 3 Encounters:   01/25/23 130/78   07/25/22 132/84   01/13/22 130/70     Lab Results   Component Value Date/Time     01/25/2023 12:20 PM    K 4.1 01/25/2023 12:20 PM     01/25/2023 12:20 PM    CO2 29 01/25/2023 12:20 PM    BUN 18 01/25/2023 12:20 PM    GFRAA >60 07/25/2022 12:05 PM     No results found for: HBA1C, [id number]  Lab Results   Component Value Date/Time    CHOL 151 01/25/2023 12:20 PM    HDL 35 01/25/2023 12:20 PM    VLDL 31 07/02/2020 11:36 AM     No results found for: VITD3, VD3RIA        Lab Results   Component Value Date/Time    TSH 1.50 01/13/2022 11:47 AM

## 2023-09-08 RX ORDER — GLIPIZIDE 10 MG/1
10 TABLET ORAL
Qty: 60 TABLET | Refills: 0 | Status: SHIPPED | OUTPATIENT
Start: 2023-09-08 | End: 2023-10-12 | Stop reason: SDUPTHER

## 2023-09-08 RX ORDER — GLIPIZIDE 10 MG/1
TABLET ORAL
Qty: 60 TABLET | Refills: 0 | OUTPATIENT
Start: 2023-09-08

## 2023-09-08 NOTE — TELEPHONE ENCOUNTER
PCP: Ara Sibley MD    Last appt: 1/25/2023    No future appointments.     Requested Prescriptions     Pending Prescriptions Disp Refills    glipiZIDE (GLUCOTROL) 10 MG tablet [Pharmacy Med Name: GLIPIZIDE 10MG TABLETS] 60 tablet 0     Sig: TAKE 1 TABLET BY MOUTH TWICE DAILY BEFORE MEALS

## 2023-10-12 RX ORDER — GLIPIZIDE 10 MG/1
10 TABLET ORAL
Qty: 60 TABLET | Refills: 0 | Status: SHIPPED | OUTPATIENT
Start: 2023-10-12 | End: 2023-10-13 | Stop reason: SDUPTHER

## 2023-10-12 RX ORDER — GLIPIZIDE 10 MG/1
10 TABLET ORAL
Qty: 90 TABLET | Refills: 0 | Status: SHIPPED | OUTPATIENT
Start: 2023-10-12 | End: 2023-10-12

## 2023-10-12 NOTE — TELEPHONE ENCOUNTER
PCP: Daphne Perez MD    Last appt: 1/25/2023  No future appointments. Requested Prescriptions     Pending Prescriptions Disp Refills    glipiZIDE (GLUCOTROL) 10 MG tablet 90 tablet 0     Sig: Take 1 tablet by mouth 2 times daily (before meals) **Must establish with new PCP for further refills. **       Prior labs and Blood pressures:  BP Readings from Last 3 Encounters:   01/25/23 130/78   07/25/22 132/84   01/13/22 130/70     Lab Results   Component Value Date/Time     01/25/2023 12:20 PM    K 4.1 01/25/2023 12:20 PM     01/25/2023 12:20 PM    CO2 29 01/25/2023 12:20 PM    BUN 18 01/25/2023 12:20 PM    GFRAA >60 07/25/2022 12:05 PM     No results found for: \"HBA1C\", \"FGM3NPFC\"  Lab Results   Component Value Date/Time    CHOL 151 01/25/2023 12:20 PM    HDL 35 01/25/2023 12:20 PM    VLDL 31 07/02/2020 11:36 AM     No results found for: \"VITD3\", \"VD3RIA\"        Lab Results   Component Value Date/Time    TSH 1.50 01/13/2022 11:47 AM

## 2023-10-12 NOTE — TELEPHONE ENCOUNTER
PCP: Mouna Marie MD    Last appt: 1/25/23    No future appointments.     Requested Prescriptions     Pending Prescriptions Disp Refills    glipiZIDE (GLUCOTROL) 10 MG tablet [Pharmacy Med Name: GLIPIZIDE 10MG TABLETS] 60 tablet 0     Sig: TAKE 1 TABLET BY MOUTH TWICE DAILY BEFORE MEALS

## 2023-10-13 ENCOUNTER — TELEPHONE (OUTPATIENT)
Facility: CLINIC | Age: 68
End: 2023-10-13

## 2023-10-13 DIAGNOSIS — E11.40 TYPE 2 DIABETES MELLITUS WITH DIABETIC NEUROPATHY, WITHOUT LONG-TERM CURRENT USE OF INSULIN (HCC): Primary | ICD-10-CM

## 2023-10-13 RX ORDER — GLIPIZIDE 10 MG/1
20 TABLET ORAL
Qty: 360 TABLET | Refills: 0 | Status: SHIPPED | OUTPATIENT
Start: 2023-10-13

## 2023-10-13 NOTE — TELEPHONE ENCOUNTER
Pt requesting glipizide be called in again as it was put in with only 90 tablets. Pt states he takes it 2 in the morning and 2 at night, and he will need 360 pills for a 90 day supply.

## 2023-11-21 NOTE — PROGRESS NOTES
Labs are stable with a fasting blood sugar of 135 and an A1c of 6.0  No change in current medications Patient is in the lateral left side position.

## 2024-03-18 RX ORDER — DILTIAZEM HYDROCHLORIDE 360 MG/1
360 CAPSULE, EXTENDED RELEASE ORAL DAILY
Qty: 30 CAPSULE | Refills: 0 | Status: SHIPPED | OUTPATIENT
Start: 2024-03-18

## 2024-03-18 NOTE — TELEPHONE ENCOUNTER
PCP: Juarez Isabel MD    Last appt: Visit date not found    No future appointments.    Requested Prescriptions     Pending Prescriptions Disp Refills    dilTIAZem (TIAZAC) 360 MG extended release capsule [Pharmacy Med Name: DILTIAZEM ER 360MG CAPSULES (24 HR)] 30 capsule 0     Sig: TAKE 1 CAPSULE BY MOUTH DAILY

## (undated) DEVICE — SET EXTN TBNG L BOR 4 W STPCOCK ST 32IN PRIMING VOL 6ML

## (undated) DEVICE — KENDALL RADIOLUCENT FOAM MONITORING ELECTRODE -RECTANGULAR SHAPE: Brand: KENDALL

## (undated) DEVICE — STAIN INDIA INK IN NACL 10ML --

## (undated) DEVICE — DEVON™ KNEE AND BODY STRAP 60" X 3" (1.5 M X 7.6 CM): Brand: DEVON

## (undated) DEVICE — SINGLE USE INJECTOR: Brand: SINGLE USE INJECTOR

## (undated) DEVICE — NEONATAL-ADULT SPO2 SENSOR: Brand: NELLCOR

## (undated) DEVICE — BLOCK BITE ENDO --

## (undated) DEVICE — SOLIDIFIER FLUID 3000 CC ABSORB

## (undated) DEVICE — CATH IV AUTOGRD BC BLU 22GA 25 -- INSYTE

## (undated) DEVICE — SET ADMIN 16ML TBNG L100IN 2 Y INJ SITE IV PIGGY BK DISP

## (undated) DEVICE — ENDO CARRY-ON PROCEDURE KIT INCLUDES ENZYMATIC SPONGE, GAUZE, BIOHAZARD LABEL, TRAY, LUBRICANT, DIRTY SCOPE LABEL, WATER LABEL, TRAY, DRAWSTRING PAD, AND DEFENDO 4-PIECE KIT.: Brand: ENDO CARRY-ON PROCEDURE KIT

## (undated) DEVICE — BW-412T DISP COMBO CLEANING BRUSH: Brand: SINGLE USE COMBINATION CLEANING BRUSH

## (undated) DEVICE — 1200 GUARD II KIT W/5MM TUBE W/O VAC TUBE: Brand: GUARDIAN

## (undated) DEVICE — QUILTED PREMIUM COMFORT UNDERPAD,EXTRA HEAVY: Brand: WINGS

## (undated) DEVICE — NEEDLE HYPO 18GA L1.5IN PNK S STL HUB POLYPR SHLD REG BVL

## (undated) DEVICE — WRISTBAND ID AD W1XL11.5IN RED POLY ALRG PREPRINTED PERM

## (undated) DEVICE — CANN NASAL O2 CAPNOGRAPHY AD -- FILTERLINE

## (undated) DEVICE — KIT IV STRT W CHLORAPREP PD 1ML

## (undated) DEVICE — BAG BELONG PT PERS CLEAR HANDL

## (undated) DEVICE — SYRINGE MED 20ML STD CLR PLAS LUERLOCK TIP N CTRL DISP

## (undated) DEVICE — SINGLE USE SPLINTING TUBE: Brand: SINGLE USE SPLINTING TUBE